# Patient Record
Sex: FEMALE | Race: OTHER | HISPANIC OR LATINO
[De-identification: names, ages, dates, MRNs, and addresses within clinical notes are randomized per-mention and may not be internally consistent; named-entity substitution may affect disease eponyms.]

---

## 2017-02-02 ENCOUNTER — APPOINTMENT (OUTPATIENT)
Dept: OBGYN | Facility: HOSPITAL | Age: 32
End: 2017-02-02

## 2018-10-08 ENCOUNTER — RESULT REVIEW (OUTPATIENT)
Age: 33
End: 2018-10-08

## 2020-03-27 ENCOUNTER — APPOINTMENT (OUTPATIENT)
Dept: PULMONOLOGY | Facility: CLINIC | Age: 35
End: 2020-03-27
Payer: COMMERCIAL

## 2020-03-27 VITALS
OXYGEN SATURATION: 99 % | TEMPERATURE: 98.5 F | RESPIRATION RATE: 16 BRPM | DIASTOLIC BLOOD PRESSURE: 72 MMHG | BODY MASS INDEX: 27.48 KG/M2 | HEIGHT: 60 IN | WEIGHT: 140 LBS | SYSTOLIC BLOOD PRESSURE: 113 MMHG | HEART RATE: 65 BPM

## 2020-03-27 LAB
HCG UR QL: NEGATIVE
QUALITY CONTROL: YES

## 2020-03-27 PROCEDURE — 81025 URINE PREGNANCY TEST: CPT

## 2020-03-27 PROCEDURE — 99204 OFFICE O/P NEW MOD 45 MIN: CPT | Mod: 25

## 2020-03-27 PROCEDURE — 71046 X-RAY EXAM CHEST 2 VIEWS: CPT

## 2020-03-27 NOTE — ASSESSMENT
[FreeTextEntry1] : low risk- incidental finding of subpleural nodule \par xray today\par urine pregnancy test negative\par \par obtain CT chest noncontrast to evaluate parenchyma\par f/u after CT chest

## 2020-03-27 NOTE — PHYSICAL EXAM
[No Acute Distress] : no acute distress [Normal Oropharynx] : normal oropharynx [Normal Appearance] : normal appearance [No Neck Mass] : no neck mass [Normal Rate/Rhythm] : normal rate/rhythm [Normal S1, S2] : normal s1, s2 [No Resp Distress] : no resp distress [Clear to Auscultation Bilaterally] : clear to auscultation bilaterally [Benign] : benign [No Edema] : no edema [No Focal Deficits] : no focal deficits [Oriented x3] : oriented x3 [Normal Affect] : normal affect

## 2020-03-27 NOTE — PROCEDURE
[FreeTextEntry1] : clare CT done\par CT abdomen pelvis\par 3/22/2020\par \par 6.5 subpleural nodule in RML.\par \par \par \par PA and lateral chest xray performed using standard projections. Bones and soft tissues structures are unremarkable.Cardiac silhouette appears normal. Lung fields are clear. No infiltrate or masses noted. Mediastinal contours are normal.\par IMPRESSION: no acute cardiopulmonary disease\par \par

## 2020-03-27 NOTE — HISTORY OF PRESENT ILLNESS
[Never] : never [TextBox_4] : PADMINI MORALES is a 34 year old female who presents with her niece\par had gone to [facility]? [facility]\par  - [facility]- for [phone] X [phone]\par for abdominal pain? on ciprofloxacin\par states she then went to get a CT? and it was recommended to her to get a CT of her lungs\par \par \par no cough, fevers, chills.  no dyspnea. never seen lung doc\par did not bring in CD of CT.\par no COVID exposure that she knows

## 2020-04-06 ENCOUNTER — APPOINTMENT (OUTPATIENT)
Dept: PULMONOLOGY | Facility: CLINIC | Age: 35
End: 2020-04-06
Payer: COMMERCIAL

## 2020-04-06 ENCOUNTER — APPOINTMENT (OUTPATIENT)
Dept: PULMONOLOGY | Facility: CLINIC | Age: 35
End: 2020-04-06

## 2020-04-06 PROCEDURE — G2012 BRIEF CHECK IN BY MD/QHP: CPT

## 2020-04-06 RX ORDER — CEFUROXIME AXETIL 500 MG/1
500 TABLET ORAL
Qty: 14 | Refills: 0 | Status: ACTIVE | COMMUNITY
Start: 2020-04-06 | End: 1900-01-01

## 2020-04-13 ENCOUNTER — APPOINTMENT (OUTPATIENT)
Dept: PULMONOLOGY | Facility: CLINIC | Age: 35
End: 2020-04-13
Payer: COMMERCIAL

## 2020-04-13 PROCEDURE — G2012 BRIEF CHECK IN BY MD/QHP: CPT

## 2020-04-13 RX ORDER — CIPROFLOXACIN HYDROCHLORIDE 500 MG/1
500 TABLET, FILM COATED ORAL
Qty: 10 | Refills: 0 | Status: DISCONTINUED | COMMUNITY
Start: 2020-03-22

## 2020-04-28 ENCOUNTER — APPOINTMENT (OUTPATIENT)
Dept: PULMONOLOGY | Facility: CLINIC | Age: 35
End: 2020-04-28
Payer: COMMERCIAL

## 2020-04-28 PROCEDURE — 99442: CPT

## 2020-05-05 ENCOUNTER — APPOINTMENT (OUTPATIENT)
Dept: PULMONOLOGY | Facility: CLINIC | Age: 35
End: 2020-05-05
Payer: COMMERCIAL

## 2020-05-05 DIAGNOSIS — R91.1 SOLITARY PULMONARY NODULE: ICD-10-CM

## 2020-05-05 DIAGNOSIS — J18.9 PNEUMONIA, UNSPECIFIED ORGANISM: ICD-10-CM

## 2020-05-05 PROCEDURE — 99441: CPT

## 2020-09-03 ENCOUNTER — EMERGENCY (EMERGENCY)
Facility: HOSPITAL | Age: 35
LOS: 1 days | Discharge: HOME | End: 2020-09-03
Attending: EMERGENCY MEDICINE | Admitting: EMERGENCY MEDICINE
Payer: COMMERCIAL

## 2020-09-03 VITALS
HEART RATE: 77 BPM | SYSTOLIC BLOOD PRESSURE: 116 MMHG | DIASTOLIC BLOOD PRESSURE: 60 MMHG | OXYGEN SATURATION: 99 % | TEMPERATURE: 97 F | RESPIRATION RATE: 14 BRPM

## 2020-09-03 LAB
ALBUMIN SERPL ELPH-MCNC: 4.3 G/DL — SIGNIFICANT CHANGE UP (ref 3.3–5)
ALP SERPL-CCNC: 98 U/L — SIGNIFICANT CHANGE UP (ref 40–120)
ALT FLD-CCNC: 12 U/L — SIGNIFICANT CHANGE UP (ref 4–33)
ANION GAP SERPL CALC-SCNC: 12 MMO/L — SIGNIFICANT CHANGE UP (ref 7–14)
APPEARANCE UR: SIGNIFICANT CHANGE UP
AST SERPL-CCNC: 17 U/L — SIGNIFICANT CHANGE UP (ref 4–32)
BACTERIA # UR AUTO: SIGNIFICANT CHANGE UP
BASOPHILS # BLD AUTO: 0.03 K/UL — SIGNIFICANT CHANGE UP (ref 0–0.2)
BASOPHILS NFR BLD AUTO: 0.4 % — SIGNIFICANT CHANGE UP (ref 0–2)
BILIRUB SERPL-MCNC: 0.3 MG/DL — SIGNIFICANT CHANGE UP (ref 0.2–1.2)
BILIRUB UR-MCNC: NEGATIVE — SIGNIFICANT CHANGE UP
BLOOD UR QL VISUAL: NEGATIVE — SIGNIFICANT CHANGE UP
BUN SERPL-MCNC: 19 MG/DL — SIGNIFICANT CHANGE UP (ref 7–23)
CALCIUM SERPL-MCNC: 8.9 MG/DL — SIGNIFICANT CHANGE UP (ref 8.4–10.5)
CHLORIDE SERPL-SCNC: 104 MMOL/L — SIGNIFICANT CHANGE UP (ref 98–107)
CO2 SERPL-SCNC: 23 MMOL/L — SIGNIFICANT CHANGE UP (ref 22–31)
COLOR SPEC: YELLOW — SIGNIFICANT CHANGE UP
CREAT SERPL-MCNC: 0.74 MG/DL — SIGNIFICANT CHANGE UP (ref 0.5–1.3)
EOSINOPHIL # BLD AUTO: 0.13 K/UL — SIGNIFICANT CHANGE UP (ref 0–0.5)
EOSINOPHIL NFR BLD AUTO: 1.7 % — SIGNIFICANT CHANGE UP (ref 0–6)
GLUCOSE SERPL-MCNC: 104 MG/DL — HIGH (ref 70–99)
GLUCOSE UR-MCNC: NEGATIVE — SIGNIFICANT CHANGE UP
HCT VFR BLD CALC: 39.9 % — SIGNIFICANT CHANGE UP (ref 34.5–45)
HGB BLD-MCNC: 13.3 G/DL — SIGNIFICANT CHANGE UP (ref 11.5–15.5)
HYALINE CASTS # UR AUTO: NEGATIVE — SIGNIFICANT CHANGE UP
IMM GRANULOCYTES NFR BLD AUTO: 0.3 % — SIGNIFICANT CHANGE UP (ref 0–1.5)
KETONES UR-MCNC: SIGNIFICANT CHANGE UP
LEUKOCYTE ESTERASE UR-ACNC: SIGNIFICANT CHANGE UP
LIDOCAIN IGE QN: 41.2 U/L — SIGNIFICANT CHANGE UP (ref 7–60)
LYMPHOCYTES # BLD AUTO: 2.48 K/UL — SIGNIFICANT CHANGE UP (ref 1–3.3)
LYMPHOCYTES # BLD AUTO: 31.8 % — SIGNIFICANT CHANGE UP (ref 13–44)
MCHC RBC-ENTMCNC: 27.7 PG — SIGNIFICANT CHANGE UP (ref 27–34)
MCHC RBC-ENTMCNC: 33.3 % — SIGNIFICANT CHANGE UP (ref 32–36)
MCV RBC AUTO: 83.1 FL — SIGNIFICANT CHANGE UP (ref 80–100)
MONOCYTES # BLD AUTO: 0.65 K/UL — SIGNIFICANT CHANGE UP (ref 0–0.9)
MONOCYTES NFR BLD AUTO: 8.3 % — SIGNIFICANT CHANGE UP (ref 2–14)
NEUTROPHILS # BLD AUTO: 4.49 K/UL — SIGNIFICANT CHANGE UP (ref 1.8–7.4)
NEUTROPHILS NFR BLD AUTO: 57.5 % — SIGNIFICANT CHANGE UP (ref 43–77)
NITRITE UR-MCNC: NEGATIVE — SIGNIFICANT CHANGE UP
NRBC # FLD: 0 K/UL — SIGNIFICANT CHANGE UP (ref 0–0)
PH UR: 6 — SIGNIFICANT CHANGE UP (ref 5–8)
PLATELET # BLD AUTO: 239 K/UL — SIGNIFICANT CHANGE UP (ref 150–400)
PMV BLD: 9.9 FL — SIGNIFICANT CHANGE UP (ref 7–13)
POTASSIUM SERPL-MCNC: 3.5 MMOL/L — SIGNIFICANT CHANGE UP (ref 3.5–5.3)
POTASSIUM SERPL-SCNC: 3.5 MMOL/L — SIGNIFICANT CHANGE UP (ref 3.5–5.3)
PROT SERPL-MCNC: 7.6 G/DL — SIGNIFICANT CHANGE UP (ref 6–8.3)
PROT UR-MCNC: 10 — SIGNIFICANT CHANGE UP
RBC # BLD: 4.8 M/UL — SIGNIFICANT CHANGE UP (ref 3.8–5.2)
RBC # FLD: 12.8 % — SIGNIFICANT CHANGE UP (ref 10.3–14.5)
RBC CASTS # UR COMP ASSIST: SIGNIFICANT CHANGE UP (ref 0–?)
SODIUM SERPL-SCNC: 139 MMOL/L — SIGNIFICANT CHANGE UP (ref 135–145)
SP GR SPEC: 1.03 — SIGNIFICANT CHANGE UP (ref 1–1.04)
SQUAMOUS # UR AUTO: SIGNIFICANT CHANGE UP
UROBILINOGEN FLD QL: NORMAL — SIGNIFICANT CHANGE UP
WBC # BLD: 7.8 K/UL — SIGNIFICANT CHANGE UP (ref 3.8–10.5)
WBC # FLD AUTO: 7.8 K/UL — SIGNIFICANT CHANGE UP (ref 3.8–10.5)
WBC UR QL: HIGH (ref 0–?)

## 2020-09-03 PROCEDURE — 76705 ECHO EXAM OF ABDOMEN: CPT | Mod: 26,59

## 2020-09-03 PROCEDURE — 99284 EMERGENCY DEPT VISIT MOD MDM: CPT

## 2020-09-03 RX ORDER — KETOROLAC TROMETHAMINE 30 MG/ML
30 SYRINGE (ML) INJECTION ONCE
Refills: 0 | Status: DISCONTINUED | OUTPATIENT
Start: 2020-09-03 | End: 2020-09-03

## 2020-09-03 RX ADMIN — Medication 30 MILLIGRAM(S): at 21:18

## 2020-09-03 NOTE — ED ADULT NURSE NOTE - CHIEF COMPLAINT QUOTE
Pt presents to ED for mid upper back pain x5days and epigastric pain since this morning w/ nausea and mild SOB. Also c/o right facial pain. Denies pmhx at this time.

## 2020-09-03 NOTE — ED PROVIDER NOTE - CLINICAL SUMMARY MEDICAL DECISION MAKING FREE TEXT BOX
patient presenting with epigastric pain radiating to back x few days. r/o acute jose vs pancreatitis. plan for routine labs, US, reassessment

## 2020-09-03 NOTE — ED PROVIDER NOTE - PATIENT PORTAL LINK FT
You can access the FollowMyHealth Patient Portal offered by [facility] by registering at the following website: [email or web address]. By joining [employer]’s FollowMyHealth portal, you will also be able to view your health information using other applications (apps) compatible with our system.

## 2020-09-03 NOTE — ED ADULT TRIAGE NOTE - CHIEF COMPLAINT QUOTE
Pt presents to ED for mid upper back pain x5days and chest pain since this morning w/ nausea and mild SOB. Also c/o right facial pain. Denies pmhx at this time. Pt presents to ED for mid upper back pain x5days and epigastric pain since this morning w/ nausea and mild SOB. Also c/o right facial pain. Denies pmhx at this time.

## 2020-09-03 NOTE — ED ADULT NURSE NOTE - OBJECTIVE STATEMENT
Pt presents to INT RM 5, AO4 ambulatory complaining of mid epigastric pain beginning this AM 9/3. Pt states "this morning I started having this pain with nausea and I also felt short of breath." Pt endorses nausea, denies vomiting and diarrhea, endorses sob and diff breathing, resp even and unlabored and SpO2 99% on RA. Pt appears to be resting comfortably, 18g placed to R AC, labs drawn and sent and pending urine preg for medication. Vitally stable, will cont to monitor.

## 2020-09-03 NOTE — ED PROVIDER NOTE - OBJECTIVE STATEMENT
Patine is a 36 y/o F with no sig pmhx presenting with c/o epigastric pain radiating to her back x few day. Pain pressure is nature, assoc with nausea, no vomiting, diarrhea, dysuria, hematuria, fever, vaginal complaint. Symptoms not assoc with food. no hx of abdominal sx Translation Service: [id number]    Kate is a 36 y/o F with no sig pmhx presenting with c/o epigastric pain radiating to her back x few day. Pain pressure is nature, assoc with nausea, no vomiting, diarrhea, dysuria, hematuria, fever, vaginal complaint. Symptoms not assoc with food. no hx of abdominal sx

## 2020-09-04 VITALS
SYSTOLIC BLOOD PRESSURE: 108 MMHG | OXYGEN SATURATION: 100 % | DIASTOLIC BLOOD PRESSURE: 50 MMHG | HEART RATE: 66 BPM | TEMPERATURE: 98 F | RESPIRATION RATE: 18 BRPM

## 2020-09-04 RX ADMIN — Medication 30 MILLIGRAM(S): at 00:22

## 2024-04-17 ENCOUNTER — NON-APPOINTMENT (OUTPATIENT)
Age: 39
End: 2024-04-17

## 2024-04-19 ENCOUNTER — INPATIENT (INPATIENT)
Facility: HOSPITAL | Age: 39
LOS: 5 days | Discharge: HOME HEALTH | End: 2024-04-25
Attending: INTERNAL MEDICINE | Admitting: INTERNAL MEDICINE
Payer: COMMERCIAL

## 2024-04-19 VITALS
DIASTOLIC BLOOD PRESSURE: 70 MMHG | TEMPERATURE: 98 F | HEART RATE: 95 BPM | RESPIRATION RATE: 16 BRPM | SYSTOLIC BLOOD PRESSURE: 117 MMHG | OXYGEN SATURATION: 99 %

## 2024-04-19 DIAGNOSIS — H70.91 UNSPECIFIED MASTOIDITIS, RIGHT EAR: ICD-10-CM

## 2024-04-19 DIAGNOSIS — Z34.90 ENCOUNTER FOR SUPERVISION OF NORMAL PREGNANCY, UNSPECIFIED, UNSPECIFIED TRIMESTER: ICD-10-CM

## 2024-04-19 DIAGNOSIS — M94.8X9 OTHER SPECIFIED DISORDERS OF CARTILAGE, UNSPECIFIED SITES: ICD-10-CM

## 2024-04-19 DIAGNOSIS — H60.501 UNSPECIFIED ACUTE NONINFECTIVE OTITIS EXTERNA, RIGHT EAR: ICD-10-CM

## 2024-04-19 DIAGNOSIS — L03.211 CELLULITIS OF FACE: ICD-10-CM

## 2024-04-19 DIAGNOSIS — Z29.9 ENCOUNTER FOR PROPHYLACTIC MEASURES, UNSPECIFIED: ICD-10-CM

## 2024-04-19 DIAGNOSIS — H61.001 UNSPECIFIED PERICHONDRITIS OF RIGHT EXTERNAL EAR: ICD-10-CM

## 2024-04-19 PROBLEM — Z78.9 OTHER SPECIFIED HEALTH STATUS: Chronic | Status: ACTIVE | Noted: 2020-09-03

## 2024-04-19 LAB
ALBUMIN SERPL ELPH-MCNC: 4 G/DL — SIGNIFICANT CHANGE UP (ref 3.3–5)
ALP SERPL-CCNC: 115 U/L — SIGNIFICANT CHANGE UP (ref 40–120)
ALT FLD-CCNC: 17 U/L — SIGNIFICANT CHANGE UP (ref 4–33)
ANION GAP SERPL CALC-SCNC: 15 MMOL/L — HIGH (ref 7–14)
APTT BLD: 29.9 SEC — SIGNIFICANT CHANGE UP (ref 24.5–35.6)
AST SERPL-CCNC: 15 U/L — SIGNIFICANT CHANGE UP (ref 4–32)
BASOPHILS # BLD AUTO: 0.03 K/UL — SIGNIFICANT CHANGE UP (ref 0–0.2)
BASOPHILS NFR BLD AUTO: 0.3 % — SIGNIFICANT CHANGE UP (ref 0–2)
BILIRUB SERPL-MCNC: 0.5 MG/DL — SIGNIFICANT CHANGE UP (ref 0.2–1.2)
BLD GP AB SCN SERPL QL: NEGATIVE — SIGNIFICANT CHANGE UP
BUN SERPL-MCNC: 8 MG/DL — SIGNIFICANT CHANGE UP (ref 7–23)
CALCIUM SERPL-MCNC: 9.3 MG/DL — SIGNIFICANT CHANGE UP (ref 8.4–10.5)
CHLORIDE SERPL-SCNC: 98 MMOL/L — SIGNIFICANT CHANGE UP (ref 98–107)
CO2 SERPL-SCNC: 22 MMOL/L — SIGNIFICANT CHANGE UP (ref 22–31)
CREAT SERPL-MCNC: 0.37 MG/DL — LOW (ref 0.5–1.3)
EGFR: 131 ML/MIN/1.73M2 — SIGNIFICANT CHANGE UP
EOSINOPHIL # BLD AUTO: 0.03 K/UL — SIGNIFICANT CHANGE UP (ref 0–0.5)
EOSINOPHIL NFR BLD AUTO: 0.3 % — SIGNIFICANT CHANGE UP (ref 0–6)
GLUCOSE SERPL-MCNC: 89 MG/DL — SIGNIFICANT CHANGE UP (ref 70–99)
HCT VFR BLD CALC: 35.7 % — SIGNIFICANT CHANGE UP (ref 34.5–45)
HGB BLD-MCNC: 11.7 G/DL — SIGNIFICANT CHANGE UP (ref 11.5–15.5)
IANC: 8.09 K/UL — HIGH (ref 1.8–7.4)
IMM GRANULOCYTES NFR BLD AUTO: 0.6 % — SIGNIFICANT CHANGE UP (ref 0–0.9)
INR BLD: 1.04 RATIO — SIGNIFICANT CHANGE UP (ref 0.85–1.18)
LYMPHOCYTES # BLD AUTO: 1.51 K/UL — SIGNIFICANT CHANGE UP (ref 1–3.3)
LYMPHOCYTES # BLD AUTO: 13.9 % — SIGNIFICANT CHANGE UP (ref 13–44)
MCHC RBC-ENTMCNC: 27.3 PG — SIGNIFICANT CHANGE UP (ref 27–34)
MCHC RBC-ENTMCNC: 32.8 GM/DL — SIGNIFICANT CHANGE UP (ref 32–36)
MCV RBC AUTO: 83.4 FL — SIGNIFICANT CHANGE UP (ref 80–100)
MONOCYTES # BLD AUTO: 1.16 K/UL — HIGH (ref 0–0.9)
MONOCYTES NFR BLD AUTO: 10.7 % — SIGNIFICANT CHANGE UP (ref 2–14)
NEUTROPHILS # BLD AUTO: 8.09 K/UL — HIGH (ref 1.8–7.4)
NEUTROPHILS NFR BLD AUTO: 74.2 % — SIGNIFICANT CHANGE UP (ref 43–77)
NRBC # BLD: 0 /100 WBCS — SIGNIFICANT CHANGE UP (ref 0–0)
NRBC # FLD: 0 K/UL — SIGNIFICANT CHANGE UP (ref 0–0)
PLATELET # BLD AUTO: 235 K/UL — SIGNIFICANT CHANGE UP (ref 150–400)
POTASSIUM SERPL-MCNC: 3.4 MMOL/L — LOW (ref 3.5–5.3)
POTASSIUM SERPL-SCNC: 3.4 MMOL/L — LOW (ref 3.5–5.3)
PROT SERPL-MCNC: 7.5 G/DL — SIGNIFICANT CHANGE UP (ref 6–8.3)
PROTHROM AB SERPL-ACNC: 11.6 SEC — SIGNIFICANT CHANGE UP (ref 9.5–13)
RBC # BLD: 4.28 M/UL — SIGNIFICANT CHANGE UP (ref 3.8–5.2)
RBC # FLD: 14.2 % — SIGNIFICANT CHANGE UP (ref 10.3–14.5)
RH IG SCN BLD-IMP: POSITIVE — SIGNIFICANT CHANGE UP
SODIUM SERPL-SCNC: 135 MMOL/L — SIGNIFICANT CHANGE UP (ref 135–145)
WBC # BLD: 10.89 K/UL — HIGH (ref 3.8–10.5)
WBC # FLD AUTO: 10.89 K/UL — HIGH (ref 3.8–10.5)

## 2024-04-19 PROCEDURE — 99223 1ST HOSP IP/OBS HIGH 75: CPT | Mod: GC

## 2024-04-19 PROCEDURE — 99285 EMERGENCY DEPT VISIT HI MDM: CPT

## 2024-04-19 PROCEDURE — 76536 US EXAM OF HEAD AND NECK: CPT | Mod: 26

## 2024-04-19 PROCEDURE — 99254 IP/OBS CNSLTJ NEW/EST MOD 60: CPT | Mod: GC

## 2024-04-19 PROCEDURE — 76815 OB US LIMITED FETUS(S): CPT | Mod: 26,59

## 2024-04-19 RX ORDER — PIPERACILLIN AND TAZOBACTAM 4; .5 G/20ML; G/20ML
3.38 INJECTION, POWDER, LYOPHILIZED, FOR SOLUTION INTRAVENOUS ONCE
Refills: 0 | Status: COMPLETED | OUTPATIENT
Start: 2024-04-19 | End: 2024-04-19

## 2024-04-19 RX ORDER — VANCOMYCIN HCL 1 G
1000 VIAL (EA) INTRAVENOUS EVERY 12 HOURS
Refills: 0 | Status: DISCONTINUED | OUTPATIENT
Start: 2024-04-19 | End: 2024-04-21

## 2024-04-19 RX ORDER — ASPIRIN/CALCIUM CARB/MAGNESIUM 324 MG
81 TABLET ORAL DAILY
Refills: 0 | Status: DISCONTINUED | OUTPATIENT
Start: 2024-04-19 | End: 2024-04-25

## 2024-04-19 RX ORDER — CIPROFLOXACIN HCL 0.3 %
5 DROPS OPHTHALMIC (EYE)
Refills: 0 | Status: DISCONTINUED | OUTPATIENT
Start: 2024-04-19 | End: 2024-04-25

## 2024-04-19 RX ORDER — ACETAMINOPHEN 500 MG
650 TABLET ORAL EVERY 6 HOURS
Refills: 0 | Status: DISCONTINUED | OUTPATIENT
Start: 2024-04-19 | End: 2024-04-25

## 2024-04-19 RX ORDER — PIPERACILLIN AND TAZOBACTAM 4; .5 G/20ML; G/20ML
3.38 INJECTION, POWDER, LYOPHILIZED, FOR SOLUTION INTRAVENOUS EVERY 8 HOURS
Refills: 0 | Status: DISCONTINUED | OUTPATIENT
Start: 2024-04-20 | End: 2024-04-25

## 2024-04-19 RX ORDER — ASPIRIN/CALCIUM CARB/MAGNESIUM 324 MG
1 TABLET ORAL
Refills: 0 | DISCHARGE

## 2024-04-19 RX ORDER — PIPERACILLIN AND TAZOBACTAM 4; .5 G/20ML; G/20ML
3.38 INJECTION, POWDER, LYOPHILIZED, FOR SOLUTION INTRAVENOUS ONCE
Refills: 0 | Status: DISCONTINUED | OUTPATIENT
Start: 2024-04-19 | End: 2024-04-19

## 2024-04-19 RX ORDER — MUPIROCIN 20 MG/G
1 OINTMENT TOPICAL
Refills: 0 | Status: DISCONTINUED | OUTPATIENT
Start: 2024-04-19 | End: 2024-04-25

## 2024-04-19 RX ORDER — ACETAMINOPHEN 500 MG
650 TABLET ORAL ONCE
Refills: 0 | Status: COMPLETED | OUTPATIENT
Start: 2024-04-19 | End: 2024-04-19

## 2024-04-19 RX ORDER — POTASSIUM CHLORIDE 20 MEQ
20 PACKET (EA) ORAL ONCE
Refills: 0 | Status: COMPLETED | OUTPATIENT
Start: 2024-04-19 | End: 2024-04-19

## 2024-04-19 RX ORDER — CIPROFLOXACIN/HYDROCORTISONE 0.2 %-1 %
3 SUSPENSION, DROPS(FINAL DOSAGE FORM)(ML) OTIC (EAR)
Refills: 0 | Status: DISCONTINUED | OUTPATIENT
Start: 2024-04-19 | End: 2024-04-19

## 2024-04-19 RX ADMIN — PIPERACILLIN AND TAZOBACTAM 200 GRAM(S): 4; .5 INJECTION, POWDER, LYOPHILIZED, FOR SOLUTION INTRAVENOUS at 18:33

## 2024-04-19 RX ADMIN — Medication 650 MILLIGRAM(S): at 12:15

## 2024-04-19 RX ADMIN — Medication 5 DROP(S): at 20:14

## 2024-04-19 RX ADMIN — Medication 650 MILLIGRAM(S): at 13:15

## 2024-04-19 RX ADMIN — PIPERACILLIN AND TAZOBACTAM 3.38 GRAM(S): 4; .5 INJECTION, POWDER, LYOPHILIZED, FOR SOLUTION INTRAVENOUS at 13:40

## 2024-04-19 RX ADMIN — Medication 20 MILLIEQUIVALENT(S): at 20:14

## 2024-04-19 RX ADMIN — MUPIROCIN 1 APPLICATION(S): 20 OINTMENT TOPICAL at 20:14

## 2024-04-19 RX ADMIN — PIPERACILLIN AND TAZOBACTAM 200 GRAM(S): 4; .5 INJECTION, POWDER, LYOPHILIZED, FOR SOLUTION INTRAVENOUS at 12:39

## 2024-04-19 RX ADMIN — Medication 3 DROP(S): at 14:25

## 2024-04-19 RX ADMIN — Medication 250 MILLIGRAM(S): at 22:02

## 2024-04-19 NOTE — CONSULT NOTE ADULT - SUBJECTIVE AND OBJECTIVE BOX
Patient is a 38y old  Female who presents with a chief complaint of     HPI:    37yo F  currently 17 weeks pregnant presenting for worsening R ear pain x2 days, associated with erythema, warmth, and tenderness in front of and behind the ear. She also noticed thin yellow discharge from the ear, difficulty opening the mouth, and muffled hearing. No fevers/chills. No recent illnesses or sick contacts. She did have a mild sore throat on Monday and Tuesday that has since resolved. No pool or hot tub use. Uses Q-tips after showering but says she does not use it deep in the ear. Was prescirbed neomycin drops on Wednesday, without relief. On admission she is afebrile with a mild leukocytosis to 10.8. ENT evaluated the patient and diagnosed patient with right chondritis, right otitis externa, and clinical mastoiditis.       REVIEW OF SYSTEMS  Constitutional: No fevers, chills, weight loss or fatigue   Skin: No rash, no phlebitis  Eyes: No discharge  ENMT: +Ear pain, ear drainage, swelling around the ear  Respiratory: No cough, no SOB  Cardiovascular:  No chest pain, palpitations or edema   Gastrointestinal: No pain, nausea, vomiting, diarrhea or constipation	  Genitourinary: No dysuria, discharge or flank pain  MSK: No arthralgias or back pain   Neurological: No HA, no weakness, no seizures, no AMS       prior hospital charts reviewed [V]  primary team notes reviewed [V]  other consultant notes reviewed [V]    PAST MEDICAL & SURGICAL HISTORY:  No pertinent past medical history      No significant past surgical history    SOCIAL HISTORY:  - Denied smoking/vaping/alcohol/recreational drug use    FAMILY HISTORY:      Allergies  No Known Allergies      ANTIMICROBIALS:      ANTIMICROBIALS (past 90 days):  MEDICATIONS  (STANDING):  piperacillin/tazobactam IVPB...   200 mL/Hr IV Intermittent (24 @ 12:39)        OTHER MEDS:   MEDICATIONS  (STANDING):      VITALS:  Vital Signs Last 24 Hrs  T(F): 98.6 (24 @ 12:19), Max: 98.6 (24 @ 12:19)    Vital Signs Last 24 Hrs  HR: 105 (24 @ 12:19) (95 - 105)  BP: 138/70 (24 @ 12:19) (117/70 - 138/70)  RR: 16 (24 @ 12:19)  SpO2: 96% (24 @ 12:19) (96% - 99%)  Wt(kg): --    EXAM:  General: Patient in no acute distress  HEENT: R ear with warmth, erythema, and tenderness in the preauricular and postauricular regions and the ear itself. White exudate blocks the ear canal. No tenderness to maxillary and frontal sinus palpation. No exudates in the oral cavity. Good dentition.  CV: S1+S2, no m/r/g appreciated   Lungs: No respiratory distress, CTAB  Abd: Soft, nontender   Ext: No cyanosis, no edema  Neuro: Alert and oriented      Labs:                        11.7   10.89 )-----------( 235      ( 2024 12:25 )             35.7         135  |  98  |  8   ----------------------------<  89  3.4<L>   |  22  |  0.37<L>    Ca    9.3      2024 12:25    TPro  7.5  /  Alb  4.0  /  TBili  0.5  /  DBili  x   /  AST  15  /  ALT  17  /  AlkPhos  115        WBC Trend:  WBC Count: 10.89 (24 @ 12:25)      Auto Neutrophil #: 8.09 K/uL (24 @ 12:25)      Creatine Trend:  Creatinine: 0.37 ()      Liver Biochemical Testing Trend:  Alanine Aminotransferase (ALT/SGPT): 17 ()  Aspartate Aminotransferase (AST/SGOT): 15 (24 @ 12:25)  Bilirubin Total: 0.5 ()    Auto Eosinophil %: 0.3 % (24 @ 12:25)    Urinalysis Basic - ( 2024 12:25 )    Color: x / Appearance: x / SG: x / pH: x  Gluc: 89 mg/dL / Ketone: x  / Bili: x / Urobili: x   Blood: x / Protein: x / Nitrite: x   Leuk Esterase: x / RBC: x / WBC x   Sq Epi: x / Non Sq Epi: x / Bacteria: x    MICROBIOLOGY: None      RADIOLOGY: None
CC: right ear pain    HPI: 39 y/o F without significant PMHx,  currently 17 weeks pregnant presents to ED with worsening right sided ear pain since Wednesday. Patient reports thin yellowish discharge draining from her right ear, that are stopped this morning, but the ear pain is getting worse both preauricular and postauricular. She is unable to open her mouth wide due to trismus and pain. No prior history of recurrent ear infection or any ear surgery in the past. Admits muffled hearing on the right. Patient reports was seen by her PMD 2 days ago and prescribed neomycin drops, without improvement. Denies fever, chills, headache, tinnitus and vertigo. No acute complaints on the left ear.        PAST MEDICAL & SURGICAL HISTORY:  No pertinent past medical history      No significant past surgical history        Allergies    No Known Allergies    Intolerances      MEDICATIONS  (STANDING):  piperacillin/tazobactam IVPB... 3.375 Gram(s) IV Intermittent once    MEDICATIONS  (PRN):      Social History: never smoker    Family history: No pertinent family history in first degree relatives    ROS:   ENT: all negative except as noted in HPI   CV: denies palpitations  Pulm: denies SOB, cough, hemoptysis  GI: denies change in appetite, indigestion, n/v  : denies pertinent urinary symptoms, urgency  Neuro: denies numbness/tingling, loss of sensation  Psych: denies anxiety  MS: denies muscle weakness, instability  Heme: denies easy bruising or bleeding  Endo: denies heat/cold intolerance, excessive sweating  Vascular: denies LE edema    Vital Signs Last 24 Hrs  T(C): 37 (2024 12:19), Max: 37 (2024 12:19)  T(F): 98.6 (2024 12:19), Max: 98.6 (2024 12:19)  HR: 105 (2024 12:19) (95 - 105)  BP: 138/70 (2024 12:19) (117/70 - 138/70)  BP(mean): --  RR: 16 (2024 12:19) (16 - 16)  SpO2: 96% (2024 12:19) (96% - 99%)    Parameters below as of 2024 11:01  Patient On (Oxygen Delivery Method): room air       PHYSICAL EXAM:  Gen: NAD  Skin: No rashes, bruises, or lesions  Head: Normocephalic, Atraumatic  Face: no edema, erythema, or fluctuance. Parotid glands soft without mass  Eyes: no scleral injection  Ears: Right - External cartilage of ear edematous and erythematous, warmth to touch. Preauricular ear fullness with fluctuance. Ear canal completely shot close, unable to see TM. Thin yellow fluid crusts seen in the external jessica bowl. No active fluid drainage presents. Mastoid tenderness and erythema, slight ear bulging.            Left - ear canal clear, TM intact without effusion or erythema. No evidence of any fluid drainage. No mastoid tenderness, erythema, or ear bulging  Nose: Nares bilaterally patent, no discharge  Mouth: No stridor, no drooling, no trismus.  Mucosa moist, tongue/uvula midline, oropharynx clear  Neck: Flat, supple, no lymphadenopathy, trachea midline, no masses  Lymphatic: No lymphadenopathy  Resp: breathing easily, no stridor  CV: no peripheral edema/cyanosis  GI: nondistended   Peripheral vascular: no JVD or edema  Neuro: facial nerve intact, no facial droop

## 2024-04-19 NOTE — ED PROVIDER NOTE - CLINICAL SUMMARY MEDICAL DECISION MAKING FREE TEXT BOX
38-year-old female G3, P2 17 weeks pregnant presenting to the emergency department due to right ear pain and swelling for the past 3 days.  Patient was given polymyxin and neomycin eardrops with no relief, now she has she has pain in the preauricular area, postauricular area, and pain when opening mouth.  On exam there is purulent drainage from the right ear with swelling completely of the right ear.  Unable to perform otoscopic exam secondary to swelling.  Patient has tenderness, erythema, and swelling to the preauricular and postauricular regions.  Patient unable to open mouth completely secondary to pain.  Concern for mastoiditis.  Spoke with ENT who recommended we give IV Zosyn, Ciprodex eardrops, and perform ultrasound of preauricular area to assess for any signs of abscess.  Patient will be admitted for IV antibiotics and MRI imaging. Jessica att: 38-year-old female G3, P2 17 weeks pregnant presenting to the emergency department due to right ear pain and swelling for the past 3 days.  Patient was given polymyxin and neomycin eardrops with no relief, now she has she has pain in the preauricular area, postauricular area, and pain when opening mouth.  On exam there is purulent drainage from the right ear with swelling completely of the right ear.  Unable to perform otoscopic exam secondary to swelling.  Patient has tenderness, erythema, and swelling to the preauricular and postauricular regions.  Patient unable to open mouth completely secondary to pain.  Concern for mastoiditis.  Spoke with ENT who recommended we give IV Zosyn, Ciprodex eardrops, and perform ultrasound of preauricular area to assess for any signs of abscess.  Patient will be admitted for IV antibiotics and MRI imaging.

## 2024-04-19 NOTE — ED ADULT NURSE NOTE - OBJECTIVE STATEMENT
This is a 38y/0 female complaining of right ear ache with light yellow discharge. Alert and oriented x 4. Denies past medical history. , 17 wks gestation. Decrease hearing on right ear, swab sent to lab. Not in any distress. IV initiated on right AC g 20, blood work sent to lab.

## 2024-04-19 NOTE — H&P ADULT - ATTENDING COMMENTS
Patient seen and examined, care d/w HS.     In summary 38F , currently 17 wks pregnant who presents with R ear pain since wednesday that has been progressing, no fevers home, no prior ear infxn.       # Mastoiditis/cellulitis/chondritis/external ear infection  - vanco/zosyn for now  - f/u ear culture  - MRSA screen  - MRI w/o  - appreciate ID and ENT    # Pregnancy  - prenatal vitamins and asa (reports borderline HTN, advised by OB to take asa 81 mg daily)  - OP Ob-gyn f/u for routine care    Dc pending infectious w/u and decision re: abx and duration pending evaluation Patient seen and examined, care d/w HS.     In summary 38F , currently 17 wks pregnant who presents with R ear pain since wednesday that has been progressing, no fevers home, no prior ear infxn.       # Mastoiditis/cellulitis/chondritis/external ear infection  - vanco/zosyn for now  - f/u ear culture  - MRSA screen  - MRI w/o  - appreciate ID and ENT    # Hypokalemia  - repleting     # Pregnancy  - prenatal vitamins and asa (reports borderline HTN, advised by OB to take asa 81 mg daily)  - OP Ob-gyn f/u for routine care    Dc pending infectious w/u and decision re: abx and duration pending evaluation Patient seen and examined, care d/w HS.     In summary 38F , currently 17 wks pregnant who presents with R ear pain since wednesday that has been progressing, no fevers home, no prior ear infxn.       # Mastoiditis/cellulitis/chondritis/external ear infection  - vanco/zosyn for now  - f/u ear culture  - check MRSA screen  - MRI w/o DEWEY pending  - appreciate ID and ENT consults     # Hypokalemia  - repleting     # Pregnancy  - prenatal vitamins and asa (reports borderline HTN, advised by OB to take asa 81 mg daily)  - OP Ob-gyn f/u for routine care    Dc pending infectious w/u and decision re: abx and duration pending evaluation

## 2024-04-19 NOTE — H&P ADULT - NSHPLABSRESULTS_GEN_ALL_CORE
LABS:                        11.7   10.89 )-----------( 235      ( 19 Apr 2024 12:25 )             35.7     04-19    135  |  98  |  8   ----------------------------<  89  3.4<L>   |  22  |  0.37<L>    Ca    9.3      19 Apr 2024 12:25    TPro  7.5  /  Alb  4.0  /  TBili  0.5  /  DBili  x   /  AST  15  /  ALT  17  /  AlkPhos  115  04-19    PT/INR - ( 19 Apr 2024 12:25 )   PT: 11.6 sec;   INR: 1.04 ratio      PTT - ( 19 Apr 2024 12:25 )  PTT:29.9 sec    Urinalysis Basic - ( 19 Apr 2024 12:25 )    Color: x / Appearance: x / SG: x / pH: x  Gluc: 89 mg/dL / Ketone: x  / Bili: x / Urobili: x   Blood: x / Protein: x / Nitrite: x   Leuk Esterase: x / RBC: x / WBC x   Sq Epi: x / Non Sq Epi: x / Bacteria: x    Microbiology     EKG: NSR, no ST changes    RADIOLOGY & ADDITIONAL TESTS:

## 2024-04-19 NOTE — PATIENT PROFILE ADULT - FUNCTIONAL ASSESSMENT - DAILY ACTIVITY 2.
Detail Level: Detailed Quality 110: Preventive Care And Screening: Influenza Immunization: Influenza Immunization Administered during Influenza season Quality 111:Pneumonia Vaccination Status For Older Adults: Pneumococcal vaccine (PPSV23) administered on or after patient’s 60th birthday and before the end of the measurement period Quality 130: Documentation Of Current Medications In The Medical Record: Current Medications Documented 4 = No assist / stand by assistance

## 2024-04-19 NOTE — CONSULT NOTE ADULT - PROBLEM SELECTOR RECOMMENDATION 2
- Please admit to medicine for IV antibiotics  - Will need image to further evaluate, CT maxillary facial and IAC vs MRI of IAC with and without contrast  - ID consult appreciated  - Can start zosyn right now - No surgical intervention at this time  - Please admit to medicine for IV antibiotics  - Can consider images if not improving CT maxillary facial and IAC vs MRI of IAC with and without contrast  - ID consult appreciated  - Can start zosyn right now  - Case discussed with Dr. Austin

## 2024-04-19 NOTE — ED ADULT TRIAGE NOTE - ARRIVAL FROM
Medication and Quantity requested:  losartan (COZAAR) 50 MG tablet - qty 180        Last Visit - 09/22/21 - Dr Aleisha Medina and phone number updated in [facility]:  Yes    Dangelo
lov 9/22/21  lrf 90 1 9/22/17
Home

## 2024-04-19 NOTE — ED ADULT NURSE NOTE - NSFALLUNIVINTERV_ED_ALL_ED
Bed/Stretcher in lowest position, wheels locked, appropriate side rails in place/Call bell, personal items and telephone in reach/Instruct patient to call for assistance before getting out of bed/chair/stretcher/Non-slip footwear applied when patient is off stretcher/[place] to call system/Physically safe environment - no spills, clutter or unnecessary equipment/Purposeful proactive rounding/Room/bathroom lighting operational, light cord in reach

## 2024-04-19 NOTE — H&P ADULT - PROBLEM SELECTOR PLAN 1
Patient presented with 3 days of R ear pain, erythema, and discharge. Concern for mastoiditis vs Perichondritis vs chondritis of right pinna vs otitis externa.  - POCUS head and neck and in ED, no abscess noted.  - MRI of head, no gadolinium  - C/w zosyn, vancomycin  - Cipro, dex ear drops  - Mupirocin ointment

## 2024-04-19 NOTE — CONSULT NOTE ADULT - ASSESSMENT
39yo F  currently 17 weeks pregnant presenting for worsening R ear pain x2 days, associated with erythema, warmth, and tenderness in front of and behind the ear. She also noticed thin yellow discharge from the ear, difficulty opening the mouth, and muffled hearing. No fevers/chills. No recent illnesses or sick contacts. No pool or hot tub use. Uses Q-tips after showering but says she does not use it deep in the ear. Was prescirbed neomycin drops on Wednesday, without relief. On admission she is afebrile with a mild leukocytosis to 10.8. ENT evaluated the patient and diagnosed patient with right chondritis, right otitis externa, and clinical mastoiditis.    Micro:  Ear Cx (?drainage): pending    Abx:  Zosyn  --> 39yo F  currently 17 weeks pregnant presenting for worsening R ear pain x2 days, associated with erythema, warmth, and tenderness in front of and behind the ear. She also noticed thin yellow discharge from the ear, difficulty opening the mouth, and muffled hearing. No fevers/chills. No recent illnesses or sick contacts. No pool or hot tub use. Uses Q-tips after showering but says she does not use it deep in the ear. Was prescirbed neomycin drops on Wednesday, without relief. On admission she is afebrile with a mild leukocytosis to 10.8. ENT evaluated the patient and diagnosed patient with right chondritis, right otitis externa, and clinical mastoiditis.    Micro:  Ear Cx (?drainage): pending    Abx:  Zosyn  -->    #Mastoiditis  #Preauricular and postauricular cellulitis  #R otitis externa    Coverage for MRSA and Pseudomonas, in addition to more usual pathogens (Strep, H flu, Moraxella, etc.) in this 17 week pregnant female is appropriate pending further information.    Recommendations:  - IV vancomycin 1g q12h  - IV Zosyn 3.375g q8h  - Obtain MRSA PCR - if negative, can stop vancomycin  - f/u ear fluid Cx  - agree with MRI IAC (please discuss if gadolinium contrast can be used in setting of pregnancy)  - otic antibiotics per ENT    d/w attending.    Saleem Christensen, PGY4  ID Fellow  [employer] Preferred  After 5pm/weekends call [phone]

## 2024-04-19 NOTE — CONSULT NOTE ADULT - ASSESSMENT
39y/o F  currently 17 weeks pregnant presents to ED with worsening right sided ear pain since Wednesday. Found to have right chondritis, right otitis externa, right preauricular fullness (fluid collection cannot be ruled out), and clinical mastoiditis. Right - External cartilage of ear edematous and erythematous, warmth to touch. Preauricular ear fullness with fluctuance. Ear canal completely shot close, unable to see TM. Thin yellow fluid crusts seen in the external jessica bowl. No active fluid drainage presents. Mastoid tenderness and erythema, slight ear bulging. Discussed with patient in detail the pro and con of CT maxilalry facial and IAC to better evaluate her, she seems hesitant for the radiation exposure to her unborn child. Discussed with patient we can start with ultrasound of the preauricular region first to r/o collection, and patient will need admission for IV antibiotics and MRI of the IAC with and without the contrast to better evaluate the mastoid cavity given her extensive clinical presentation.

## 2024-04-19 NOTE — H&P ADULT - PROBLEM SELECTOR PLAN 2
. 17 weeks pregnant.  - Avoid teratogens and radiation.  - C/w prenatal vitamins  - C/w ASA 81 for preeclampsia

## 2024-04-19 NOTE — ED PROVIDER NOTE - PHYSICAL EXAMINATION
Harinder Pichardo MD (PGY1)   Physical Exam:    Gen: NAD, AOx3, non-toxic appearing, able to ambulate without assistance  Head: NCAT  HEENT: Right ear canal swollen completely with purulent drainage. Erythema and swelling to the pre-auricular and post-auricular area with tenderness to palpation. Patient unable to open mouth completely due to pain. EOMI, PEERLA, normal conjunctiva, tongue midline, oral mucosa moist  Lung: CTAB, no respiratory distress, no wheezes/rhonchi/rales B/L  CV: RRR, no murmurs, rubs or gallops  Neuro: No focal sensory or motor deficits  Skin: Warm, well perfused, no rash, no leg swelling  Psych: normal affect, calm

## 2024-04-19 NOTE — CONSULT NOTE ADULT - PROBLEM SELECTOR RECOMMENDATION 4
- Please admit to medicine for IV antibiotics  - Can consider ultrasound of the right preauricular area to r/o collection, if there is collection present, please consult OMFS   - Can start zosyn right now  - ID consult appreciated - Please admit to medicine for IV antibiotics  - Can consider ultrasound of the right preauricular area to r/o collection, if there is collection present, please consult OMFS   - Can start zosyn right now  - ID consult appreciated  - Case discussed with Dr. Austin

## 2024-04-19 NOTE — H&P ADULT - NSHPPHYSICALEXAM_GEN_ALL_CORE
PHYSICAL EXAM:  GENERAL: NAD, Resting in bed  Eyes: EOMI, PERRLA, conjunctiva and sclera clear  HENT:  + Erythema, swelling, and yellow discharge from R ear in pre and post auricular area. Head atraumatic; Moist mucous membranes, normal oropharynx  NECK: Supple, No JVD, no lymphadenopathy, no thyroid nodules or enlargement  CHEST/LUNG: Clear to auscultation bilaterally; No rales, rhonchi, wheezing, or rubs. Unlabored respirations on room air  HEART: Regular rate and rhythm; No murmurs, rubs, or gallops  ABDOMEN: Bowel sounds present; Soft, Nontender, Nondistended.   EXTREMITIES:  2+ Peripheral Pulses, brisk capillary refill. No clubbing, cyanosis, or edema  NERVOUS SYSTEM:  Alert & Oriented X3, non-focal and spontaneous movements of all extremities  SKIN: No rashes or lesions  Psych: Normal behavior, normal affect, normal speech T(C): 36.6 (04-19-24 @ 17:40), Max: 37 (04-19-24 @ 12:19)  HR: 95 (04-19-24 @ 17:40) (86 - 105)  BP: 123/68 (04-19-24 @ 17:40) (109/64 - 138/70)  RR: 16 (04-19-24 @ 17:40) (16 - 16)  SpO2: 97% (04-19-24 @ 17:40) (96% - 99%)    PHYSICAL EXAM:  GENERAL: NAD, Resting in bed  Eyes: EOMI, PERRLA, conjunctiva and sclera clear  HENT:  + Erythema, swelling, and yellow discharge from R ear in pre and post auricular area. Head atraumatic; Moist mucous membranes, normal oropharynx  NECK: Supple, No JVD, no lymphadenopathy, no thyroid nodules or enlargement  CHEST/LUNG: Clear to auscultation bilaterally; No rales, rhonchi, wheezing, or rubs. Unlabored respirations on room air  HEART: Regular rate and rhythm; No murmurs, rubs, or gallops  ABDOMEN: Bowel sounds present; Soft, Nontender, Nondistended.   EXTREMITIES:  2+ Peripheral Pulses, brisk capillary refill. No clubbing, cyanosis, or edema  NERVOUS SYSTEM:  Alert & Oriented X3, non-focal and spontaneous movements of all extremities  SKIN: No rashes or lesions  Psych: Normal behavior, normal affect, normal speech

## 2024-04-19 NOTE — CONSULT NOTE ADULT - ATTENDING COMMENTS
38-year-old female who is currently pregnant 17 weeks who was admitted to the hospital due to right ear pain.    Patient reports developing right-sided ear pain earlier this week, reports it started as itching.  Patient then reports the pain had worsened over the next few days with development of erythema, warmth and tenderness involving all parts of the ear.  Patient also noted yellow discharge from the ear as well.  Denied any fevers or chills.  Patient did report a sore throat that has resolved earlier this week.  Patient denies any trauma to the ear.  Denies any recent new piercings.  Denies any sick contacts, has 2 young kids at home for not sick.  No recent travel.  No pets at home.    Patient sought medical attention after worsening of symptoms and was given neomycin drops patient then presented to urgent care and was referred to the ED for further evaluation.  Culture of drainage was obtained and is pending.    Upon arrival to the hospital, patient is afebrile, labs show leukocytosis of 10.8, ENT evaluated.     #Perichondritis, right-sided  #Mastoiditis, right-sided  #Right-sided preauricular cellulitis    Recommendations  Would continue vancomycin and piperacillin/tazobactam, reviewed with pharmacy and safe during pregnancy  Would obtain MRSA PCR, if negative then discontinue vancomycin  Follow pending culture from fluid of the ear  .  Further imaging to be obtained, MRI IAC–caution with gadolinium during pregnancy, would discuss with radiology and ENT  Follow fever curve and WBC count    José Manuel Fontenot MD  Division of Infectious Diseases

## 2024-04-19 NOTE — ED PROVIDER NOTE - PROGRESS NOTE DETAILS
I spoke with the ENT who recommended IV Zosyn for chondritis/perichondritis, infectious disease consult, and CT versus MRI to evaluate for mastoiditis.  Patient will require admission for IV antibiotics.  I spoke with the patient who requested MRI due to concerns for radiation.  No order sent for patient and infectious disease consulted.    Harinder Pichardo MD (PGY 1) Patient will be admitted. Hospitalist agreed to admit the patient.    Harinder Pichardo MD (PGY 1)

## 2024-04-19 NOTE — H&P ADULT - HISTORY OF PRESENT ILLNESS
PADMINI MORALES is a 38y Female with no significant past medical history presenting for right ear pain and swelling for the past 3 days.    Patient states that about 3 days ago, she started to notice her ear was itchy and then started to become more red, swollen, and painful. Started to notice yellow discharge draining out of her ear. Patient was given polymyxin and neomycin eardrops with no relief. The day prior to presentation, she started to feel pain in the preauricular area, postauricular area, and pain when opening mouth and chewing prompting her to present to ED.  Patient denies fevers, chills, body aches, throat pain, difficulty breathing, difficulty swallowing, recent sick contacts.    ED Course:  Vital Signs on Presentation: Temp: 98.2 F ( 36.8 C) | BP: 117/70 mmHg | HR: 95 | RR: 16 | SpO2: 95% RA  Imaging:  - POCUS head and neck soft tissue: No abscess visualized  - POCUS pregnancy: Live IUP  Medications Given: Zosyn, vancomycin

## 2024-04-19 NOTE — H&P ADULT - ASSESSMENT
PADMINI MORALES is a 38y Female with no significant past medical history presenting for right ear pain and swelling for the past 3 days. Concern for mastoiditis, initiated on zosyn and vancomycin.

## 2024-04-19 NOTE — ED ADULT NURSE REASSESSMENT NOTE - NS ED NURSE REASSESS COMMENT FT1
patient AOX4 , c/o pain to right ear, breathing even and unlabored. NAD. admitted. 17 weeks pregnant, endorses good baby movements. ROLDAN september 18. admitted. pending bed.

## 2024-04-19 NOTE — CONSULT NOTE ADULT - PROBLEM SELECTOR RECOMMENDATION 3
- Please admit to medicine for IV antibiotics  - S/p wick placement  - ciprodex gtts 5 gtts to ear BID for 2 weeks  - Culture obtained and sent to lab  - Can start zosyn right now  - ID consult appreciated  - NSAIDs for pain  - Do not submerge head/water under water  - Water precaution when showering to avoid any water get into the ears by placing cotton ball in the outter ear cover by a layer of petroleum jelly - Please admit to medicine for IV antibiotics  - S/p wick placement  - ciprodex gtts 5 gtts to ear BID for 2 weeks  - Culture obtained and sent to lab  - Can start zosyn right now  - ID consult appreciated  - NSAIDs for pain  - Do not submerge head/water under water  - Water precaution when showering to avoid any water get into the ears by placing cotton ball in the outter ear cover by a layer of petroleum jelly  - Case discussed with Dr. Austin

## 2024-04-19 NOTE — ED PROVIDER NOTE - OBJECTIVE STATEMENT
38-year-old female G3, P2 17 weeks pregnant presenting to the emergency department due to right ear pain and swelling for the past 3 days.  Patient was given polymyxin and neomycin eardrops with no relief, now she has she has pain in the preauricular area, postauricular area, and pain when opening mouth.  Patient denies fevers, chills, body aches, throat pain, difficulty breathing, difficulty swallowing.

## 2024-04-19 NOTE — CONSULT NOTE ADULT - PROBLEM SELECTOR RECOMMENDATION 9
- Please admit to medicine for IV antibiotics  - ID consult appreciated  - Can start zosyn right now - Please admit to medicine for IV antibiotics  - ID consult appreciated  - Can start zosyn right now  - Please put mupirocin ointment BID for 7 days  - Case discussed with Dr. Austin

## 2024-04-19 NOTE — H&P ADULT - NSHPREVIEWOFSYSTEMS_GEN_ALL_CORE
CONSTITUTIONAL: No weakness, fevers or chills  EYES/ENT: + Ear pain, yellow dischareg. No visual changes;  No vertigo or throat pain   NECK: No pain or stiffness  RESPIRATORY: No cough, wheezing, hemoptysis; No shortness of breath  CARDIOVASCULAR: No chest pain or palpitations  GASTROINTESTINAL: No abdominal or epigastric pain. No nausea, vomiting, or hematemesis; No diarrhea or constipation. No melena or hematochezia.  GENITOURINARY: No dysuria, frequency or hematuria  NEUROLOGICAL: No numbness or weakness  SKIN: No itching, burning, rashes, or lesions   PSYCH: no hx depression, no hx anxiety

## 2024-04-20 ENCOUNTER — TRANSCRIPTION ENCOUNTER (OUTPATIENT)
Age: 39
End: 2024-04-20

## 2024-04-20 LAB
ANION GAP SERPL CALC-SCNC: 13 MMOL/L — SIGNIFICANT CHANGE UP (ref 7–14)
BASOPHILS # BLD AUTO: 0.03 K/UL — SIGNIFICANT CHANGE UP (ref 0–0.2)
BASOPHILS NFR BLD AUTO: 0.3 % — SIGNIFICANT CHANGE UP (ref 0–2)
BUN SERPL-MCNC: 7 MG/DL — SIGNIFICANT CHANGE UP (ref 7–23)
CALCIUM SERPL-MCNC: 8.8 MG/DL — SIGNIFICANT CHANGE UP (ref 8.4–10.5)
CHLORIDE SERPL-SCNC: 101 MMOL/L — SIGNIFICANT CHANGE UP (ref 98–107)
CO2 SERPL-SCNC: 21 MMOL/L — LOW (ref 22–31)
CREAT SERPL-MCNC: 0.44 MG/DL — LOW (ref 0.5–1.3)
CRP SERPL-MCNC: 111.9 MG/L — HIGH
EGFR: 125 ML/MIN/1.73M2 — SIGNIFICANT CHANGE UP
EOSINOPHIL # BLD AUTO: 0.08 K/UL — SIGNIFICANT CHANGE UP (ref 0–0.5)
EOSINOPHIL NFR BLD AUTO: 0.9 % — SIGNIFICANT CHANGE UP (ref 0–6)
ERYTHROCYTE [SEDIMENTATION RATE] IN BLOOD: 75 MM/HR — HIGH (ref 4–25)
GLUCOSE SERPL-MCNC: 85 MG/DL — SIGNIFICANT CHANGE UP (ref 70–99)
HCT VFR BLD CALC: 33.8 % — LOW (ref 34.5–45)
HGB BLD-MCNC: 11.4 G/DL — LOW (ref 11.5–15.5)
IANC: 6.38 K/UL — SIGNIFICANT CHANGE UP (ref 1.8–7.4)
IMM GRANULOCYTES NFR BLD AUTO: 0.4 % — SIGNIFICANT CHANGE UP (ref 0–0.9)
LYMPHOCYTES # BLD AUTO: 1.55 K/UL — SIGNIFICANT CHANGE UP (ref 1–3.3)
LYMPHOCYTES # BLD AUTO: 17 % — SIGNIFICANT CHANGE UP (ref 13–44)
MAGNESIUM SERPL-MCNC: 2 MG/DL — SIGNIFICANT CHANGE UP (ref 1.6–2.6)
MCHC RBC-ENTMCNC: 27.7 PG — SIGNIFICANT CHANGE UP (ref 27–34)
MCHC RBC-ENTMCNC: 33.7 GM/DL — SIGNIFICANT CHANGE UP (ref 32–36)
MCV RBC AUTO: 82 FL — SIGNIFICANT CHANGE UP (ref 80–100)
MONOCYTES # BLD AUTO: 1.06 K/UL — HIGH (ref 0–0.9)
MONOCYTES NFR BLD AUTO: 11.6 % — SIGNIFICANT CHANGE UP (ref 2–14)
MRSA PCR RESULT.: SIGNIFICANT CHANGE UP
NEUTROPHILS # BLD AUTO: 6.38 K/UL — SIGNIFICANT CHANGE UP (ref 1.8–7.4)
NEUTROPHILS NFR BLD AUTO: 69.8 % — SIGNIFICANT CHANGE UP (ref 43–77)
NRBC # BLD: 0 /100 WBCS — SIGNIFICANT CHANGE UP (ref 0–0)
NRBC # FLD: 0 K/UL — SIGNIFICANT CHANGE UP (ref 0–0)
PHOSPHATE SERPL-MCNC: 3.8 MG/DL — SIGNIFICANT CHANGE UP (ref 2.5–4.5)
PLATELET # BLD AUTO: 226 K/UL — SIGNIFICANT CHANGE UP (ref 150–400)
POTASSIUM SERPL-MCNC: 3.3 MMOL/L — LOW (ref 3.5–5.3)
POTASSIUM SERPL-SCNC: 3.3 MMOL/L — LOW (ref 3.5–5.3)
RBC # BLD: 4.12 M/UL — SIGNIFICANT CHANGE UP (ref 3.8–5.2)
RBC # FLD: 14.2 % — SIGNIFICANT CHANGE UP (ref 10.3–14.5)
S AUREUS DNA NOSE QL NAA+PROBE: SIGNIFICANT CHANGE UP
SODIUM SERPL-SCNC: 135 MMOL/L — SIGNIFICANT CHANGE UP (ref 135–145)
WBC # BLD: 9.14 K/UL — SIGNIFICANT CHANGE UP (ref 3.8–10.5)
WBC # FLD AUTO: 9.14 K/UL — SIGNIFICANT CHANGE UP (ref 3.8–10.5)

## 2024-04-20 PROCEDURE — 99232 SBSQ HOSP IP/OBS MODERATE 35: CPT | Mod: GC

## 2024-04-20 RX ORDER — POTASSIUM CHLORIDE 20 MEQ
40 PACKET (EA) ORAL ONCE
Refills: 0 | Status: COMPLETED | OUTPATIENT
Start: 2024-04-20 | End: 2024-04-20

## 2024-04-20 RX ADMIN — Medication 81 MILLIGRAM(S): at 12:31

## 2024-04-20 RX ADMIN — MUPIROCIN 1 APPLICATION(S): 20 OINTMENT TOPICAL at 21:48

## 2024-04-20 RX ADMIN — Medication 1 TABLET(S): at 12:32

## 2024-04-20 RX ADMIN — Medication 650 MILLIGRAM(S): at 12:32

## 2024-04-20 RX ADMIN — Medication 650 MILLIGRAM(S): at 13:32

## 2024-04-20 RX ADMIN — MUPIROCIN 1 APPLICATION(S): 20 OINTMENT TOPICAL at 09:32

## 2024-04-20 RX ADMIN — Medication 250 MILLIGRAM(S): at 23:26

## 2024-04-20 RX ADMIN — PIPERACILLIN AND TAZOBACTAM 25 GRAM(S): 4; .5 INJECTION, POWDER, LYOPHILIZED, FOR SOLUTION INTRAVENOUS at 12:32

## 2024-04-20 RX ADMIN — Medication 250 MILLIGRAM(S): at 09:37

## 2024-04-20 RX ADMIN — Medication 40 MILLIEQUIVALENT(S): at 12:32

## 2024-04-20 RX ADMIN — Medication 5 DROP(S): at 09:32

## 2024-04-20 RX ADMIN — PIPERACILLIN AND TAZOBACTAM 25 GRAM(S): 4; .5 INJECTION, POWDER, LYOPHILIZED, FOR SOLUTION INTRAVENOUS at 02:54

## 2024-04-20 RX ADMIN — Medication 5 DROP(S): at 21:48

## 2024-04-20 RX ADMIN — PIPERACILLIN AND TAZOBACTAM 25 GRAM(S): 4; .5 INJECTION, POWDER, LYOPHILIZED, FOR SOLUTION INTRAVENOUS at 19:47

## 2024-04-20 NOTE — DISCHARGE NOTE PROVIDER - NSDCCPTREATMENT_GEN_ALL_CORE_FT
PRINCIPAL PROCEDURE  Procedure: US head  Findings and Treatment: INTERPRETATION: CLINICAL HISTORY: Swelling in the right periauricular region, anterior to the right ear.  COMPARISON: NONE  TECHNIQUE: Focused grayscale sonographic imaging of the right periauricular region was performed along with color doppler evaluation.  FINDINGS/  IMPRESSION:  Focused evaluation at the site of clinical concern reveals a normal lymph node of 6 mm short axis. No fluid collection.

## 2024-04-20 NOTE — DISCHARGE NOTE PROVIDER - HOSPITAL COURSE
HPI:  PADMINI MORALES is a 38y Female with no significant past medical history presenting for right ear pain and swelling for the past 3 days.    Patient states that about 3 days ago, she started to notice her ear was itchy and then started to become more red, swollen, and painful. Started to notice yellow discharge draining out of her ear. Patient was given polymyxin and neomycin eardrops with no relief. The day prior to presentation, she started to feel pain in the preauricular area, postauricular area, and pain when opening mouth and chewing prompting her to present to ED.  Patient denies fevers, chills, body aches, throat pain, difficulty breathing, difficulty swallowing, recent sick contacts.    ED Course:  Vital Signs on Presentation: Temp: 98.2 F ( 36.8 C) | BP: 117/70 mmHg | HR: 95 | RR: 16 | SpO2: 95% RA  Imaging:  - POCUS head and neck soft tissue: No abscess visualized  - POCUS pregnancy: Live IUP  Medications Given: Zosyn, vancomycin (19 Apr 2024 18:45)    Hospital Course:  Patient admitted to medicine and continued on Zosyn and vancomycin, along with cipro-dex ear drop. Medications reviewed with pharmacy for teratogenicity prior to administration. Patient showing significant improvement in ear pain, swelling, and erythema on day 2 of abx, after which it was deemed no further MR imaging needed and no surgical intervention warranted per ENT. Cultures growing ____. Patient discharged with a ___ day course of ____.      Important Medication Changes and Reason:    Active or Pending Issues Requiring Follow-up:  - Mastoiditis    Advanced Directives:   [X] Full code  [ ] DNR  [ ] Hospice    Discharge Diagnoses:  - Mastoiditis HPI:  PADMINI MORALES is a 38y Female with no significant past medical history presenting for right ear pain and swelling for the past 3 days.    Patient states that about 3 days ago, she started to notice her ear was itchy and then started to become more red, swollen, and painful. Started to notice yellow discharge draining out of her ear. Patient was given polymyxin and neomycin eardrops with no relief. The day prior to presentation, she started to feel pain in the preauricular area, postauricular area, and pain when opening mouth and chewing prompting her to present to ED.  Patient denies fevers, chills, body aches, throat pain, difficulty breathing, difficulty swallowing, recent sick contacts.    ED Course:  Vital Signs on Presentation: Temp: 98.2 F ( 36.8 C) | BP: 117/70 mmHg | HR: 95 | RR: 16 | SpO2: 95% RA  Imaging:  - POCUS head and neck soft tissue: No abscess visualized  - POCUS pregnancy: Live IUP  Medications Given: Zosyn, vancomycin (19 Apr 2024 18:45)    Hospital Course:  Patient admitted to medicine and continued on Zosyn and vancomycin, along with cipro-dex ear drop. Medications reviewed with pharmacy for teratogenicity prior to administration. Patient showing significant improvement in ear pain, swelling, and erythema on day 2 of abx, after which it was deemed no further MR imaging needed and no surgical intervention warranted per ENT. Cultures growing psuedomonas, klebsiella, and staph aureus. Patient discharged with a ___ day course of ____.      Important Medication Changes and Reason:    Active or Pending Issues Requiring Follow-up:  - Mastoiditis    Advanced Directives:   [X] Full code  [ ] DNR  [ ] Hospice    Discharge Diagnoses:  - Mastoiditis HPI:  PADMINI MORALES is a 38y Female with no significant past medical history presenting for right ear pain and swelling for the past 3 days.    Patient states that about 3 days ago, she started to notice her ear was itchy and then started to become more red, swollen, and painful. Started to notice yellow discharge draining out of her ear. Patient was given polymyxin and neomycin eardrops with no relief. The day prior to presentation, she started to feel pain in the preauricular area, postauricular area, and pain when opening mouth and chewing prompting her to present to ED.  Patient denies fevers, chills, body aches, throat pain, difficulty breathing, difficulty swallowing, recent sick contacts.    ED Course:  Vital Signs on Presentation: Temp: 98.2 F ( 36.8 C) | BP: 117/70 mmHg | HR: 95 | RR: 16 | SpO2: 95% RA  Imaging:  - POCUS head and neck soft tissue: No abscess visualized  - POCUS pregnancy: Live IUP  Medications Given: Zosyn, vancomycin (19 Apr 2024 18:45)    Hospital Course:  Patient admitted to medicine and continued on Zosyn and vancomycin, along with cipro-dex ear drop. Medications reviewed with pharmacy for teratogenicity prior to administration. Patient showing significant improvement in ear pain, swelling, and erythema on day 2 of abx, after which it was deemed no further MR imaging needed and no surgical intervention warranted per ENT. Cultures growing psuedomonas, klebsiella, and staph aureus. Patient discharged to complete a 6 week course of cefepime 2g q8h, may be shortened to 4 weeks depending on clinical response.      Important Medication Changes and Reason:  - Cefepime 2g q8h for 4-6 weeks    Active or Pending Issues Requiring Follow-up:  - Mastoiditis    Advanced Directives:   [X] Full code  [ ] DNR  [ ] Hospice    Discharge Diagnoses:  - Mastoiditis HPI:  PADMINI MORALES is a 38y Female with no significant past medical history presenting for right ear pain and swelling for the past 3 days.    Patient states that about 3 days ago, she started to notice her ear was itchy and then started to become more red, swollen, and painful. Started to notice yellow discharge draining out of her ear. Patient was given polymyxin and neomycin eardrops with no relief. The day prior to presentation, she started to feel pain in the preauricular area, postauricular area, and pain when opening mouth and chewing prompting her to present to ED.  Patient denies fevers, chills, body aches, throat pain, difficulty breathing, difficulty swallowing, recent sick contacts.    ED Course:  Vital Signs on Presentation: Temp: 98.2 F ( 36.8 C) | BP: 117/70 mmHg | HR: 95 | RR: 16 | SpO2: 95% RA  Imaging:  - POCUS head and neck soft tissue: No abscess visualized  - POCUS pregnancy: Live IUP  Medications Given: Zosyn, vancomycin (19 Apr 2024 18:45)    Hospital Course:  Patient admitted to medicine and continued on Zosyn, along with cipro-dex ear drop. Medications reviewed with pharmacy for teratogenicity prior to administration. Patient showing significant improvement in ear pain, swelling, and erythema on day 2 of abx, after which it was deemed no further MR imaging needed and no surgical intervention warranted per ENT. Per ENT low concern for Mastoiditis. Cultures growing psuedomonas, klebsiella, and staph aureus. Patient discharged to complete a 2 week course of cefepime 2g q8h, may be shortened to 4 weeks depending on clinical response.      Important Medication Changes and Reason:  - Cefepime 2g q8h for 2 weeks    Active or Pending Issues Requiring Follow-up:  - Otitis Externa     Advanced Directives:   [X] Full code  [ ] DNR  [ ] Hospice    Discharge Diagnoses:  - Otitis Externa HPI:  PADMINI MORALES is a 38y Female with no significant past medical history presenting for right ear pain and swelling for the past 3 days.    Patient states that about 3 days ago, she started to notice her ear was itchy and then started to become more red, swollen, and painful. Started to notice yellow discharge draining out of her ear. Patient was given polymyxin and neomycin eardrops with no relief. The day prior to presentation, she started to feel pain in the preauricular area, postauricular area, and pain when opening mouth and chewing prompting her to present to ED.  Patient denies fevers, chills, body aches, throat pain, difficulty breathing, difficulty swallowing, recent sick contacts.    ED Course:  Vital Signs on Presentation: Temp: 98.2 F ( 36.8 C) | BP: 117/70 mmHg | HR: 95 | RR: 16 | SpO2: 95% RA  Imaging:  - POCUS head and neck soft tissue: No abscess visualized  - POCUS pregnancy: Live IUP  Medications Given: Zosyn, vancomycin (19 Apr 2024 18:45)    Hospital Course:  Patient admitted to medicine and continued on Zosyn, along with cipro-dex ear drop. Medications reviewed with pharmacy for teratogenicity prior to administration. Patient showing significant improvement in ear pain, swelling, and erythema on day 2 of abx, after which it was deemed no further MR imaging needed and no surgical intervention warranted per ENT. Per ENT low concern for Mastoiditis. Cultures growing psuedomonas, klebsiella, and staph aureus. Patient discharged to complete a 2 week course of cefepime 2g q8h, may be shortened to 4 weeks depending on clinical response. Patient to follow up with ENT and ID. Midline to be removed in subsequent clinic visit.       Important Medication Changes and Reason:  - Cefepime 2g q8h for 2 weeks    Active or Pending Issues Requiring Follow-up:  - Otitis Externa     Advanced Directives:   [X] Full code  [ ] DNR  [ ] Hospice    Discharge Diagnoses:  - Otitis Externa

## 2024-04-20 NOTE — DISCHARGE NOTE PROVIDER - DISCHARGING ATTENDING PHYSICIAN:
Left message for the patient that her problem is not being caused by the hiatal hernia. She should follow-up with the physicians as recommended. Ashutosh Mortensen MD

## 2024-04-20 NOTE — PROGRESS NOTE ADULT - ASSESSMENT
38F  17wkg pw R ear pain c/w severe OE with auricular/periauricular cellulitis. Right EAC with improving edema, wick replaced, unable to fully visualize TM. No fluctuance. US negative for collection.     - continue ciprodex drops  - continue systemic abx per ID  - ENT to re-examine tomorrow

## 2024-04-20 NOTE — DISCHARGE NOTE PROVIDER - CARE PROVIDER_API CALL
Rey Quevedo.  Infectious Disease  [address] [place] [id number]  Phone: [phone]  Fax: [phone]  Follow Up Time: 1 month

## 2024-04-20 NOTE — DISCHARGE NOTE PROVIDER - NSDCCPCAREPLAN_GEN_ALL_CORE_FT
PRINCIPAL DISCHARGE DIAGNOSIS  Diagnosis: Mastoiditis  Assessment and Plan of Treatment: You were diagnosed with an infection of the bone surrounding the ear, which we call mastoiditis. You received a combination of antibiotics called vancomycin and zosyn while inpatient. Left untreated, this type of infection could quickly progress and cause life-threatening complications. Please make sure to complete the recommended antibiotic course upon discharge and to follo-up closely outpatient.     PRINCIPAL DISCHARGE DIAGNOSIS  Diagnosis: Otitis externa  Assessment and Plan of Treatment: You were diagnosed with an infection of the external right ear, which we call otitis externa. You received IV antibiotics while you were in the hospital along with antibiotic and steroid ear drops. In order to ensure that the infection is completely treated, you will need to complete 2 week total course of IV antibiotics along with the ear drops. You will be discharged with an IV that will allow you complete the IV antibiotics outpatient. Please complete the course of antibiotics as prescribed. Please follow up with your primary care provider, ENT and infectious disease within 2 weeks of discharge.     PRINCIPAL DISCHARGE DIAGNOSIS  Diagnosis: Otitis externa  Assessment and Plan of Treatment: You were diagnosed with an infection of the external right ear, which we call otitis externa. You received IV antibiotics while you were in the hospital along with antibiotic and steroid ear drops. In order to ensure that the infection is completely treated, you will need to complete 2 week total course of IV antibiotics along with the ear drops. You will be discharged with an IV that will allow you complete the IV antibiotics outpatient. Please complete the course of antibiotics as prescribed. Please follow up with your primary care provider, ENT and infectious disease within 2 weeks of discharge.  Please be sure to let your providers know you have a midline. If ID or ENT decide the antibiotic course is finished, please have this removed in clinic.      SECONDARY DISCHARGE DIAGNOSES  Diagnosis: Pregnant  Assessment and Plan of Treatment: Please see your Ob doctors asap to follow up

## 2024-04-20 NOTE — DISCHARGE NOTE PROVIDER - NSDCMRMEDTOKEN_GEN_ALL_CORE_FT
aspirin 81 mg oral delayed release tablet: 1 tab(s) orally once a day  Prenatal Multivitamins oral tablet: orally once a day   aspirin 81 mg oral delayed release tablet: 1 tab(s) orally once a day  Complete Blood Count: Please draw WEEKLY and fax results to Dr. Rey Quevedo at [phone]  Comprehensive Metabolic Panel: Please draw WEEKLY and fax results to Dr. Rey Quevedo at [phone]  Prenatal Multivitamins oral tablet: orally once a day   aspirin 81 mg oral delayed release tablet: 1 tab(s) orally once a day  cefepime 2 g/100 mL intravenous solution: 2 gram(s) intravenously 3 times a day  Complete Blood Count: Please draw WEEKLY and fax results to Dr. Rey Quevedo at [phone]  Comprehensive Metabolic Panel: Please draw WEEKLY and fax results to Dr. Rey Quevedo at [phone]  Prenatal Multivitamins oral tablet: orally once a day   aspirin 81 mg oral delayed release tablet: 1 tab(s) orally once a day  cefepime 2 g/100 mL intravenous solution: 2 gram(s) intravenously every 12 hours  ciprofloxacin-dexamethasone 0.3%-0.1% otic suspension: 5 drop(s) in the right ear 2 times a day  Complete Blood Count: Please draw WEEKLY and fax results to Dr. Rey Quevedo at [phone]  Comprehensive Metabolic Panel: Please draw WEEKLY and fax results to Dr. Rey Quevedo at [phone]  Prenatal Multivitamins oral tablet: orally once a day

## 2024-04-20 NOTE — PROGRESS NOTE ADULT - SUBJECTIVE AND OBJECTIVE BOX
ENT Progress Note    Interval: examined at bedside.l NAEON. AVSS. Patient reports pain is slightly improving. right ear wick replaced        PAST MEDICAL & SURGICAL HISTORY:  No pertinent past medical history      No significant past surgical history        Allergies    No Known Allergies    Intolerances      MEDICATIONS  (STANDING):  aspirin  chewable 81 milliGRAM(s) Oral daily  ciprofloxacin  0.3% Ophthalmic Solution for Otic Use 5 Drop(s) Right Ear two times a day  dexAMETHasone 0.1% Ophthalmic Solution for OTIC Use 5 Drop(s) Right Ear two times a day  mupirocin 2% Ointment 1 Application(s) Topical two times a day  piperacillin/tazobactam IVPB.. 3.375 Gram(s) IV Intermittent every 8 hours  prenatal multivitamin 1 Tablet(s) Oral daily  vancomycin  IVPB 1000 milliGRAM(s) IV Intermittent every 12 hours    MEDICATIONS  (PRN):  acetaminophen     Tablet .. 650 milliGRAM(s) Oral every 6 hours PRN Temp greater or equal to 38C (100.4F), Mild Pain (1 - 3)        Vital Signs Last 24 Hrs  T(C): 36.6 (20 Apr 2024 12:34), Max: 36.6 (20 Apr 2024 12:34)  T(F): 97.9 (20 Apr 2024 12:34), Max: 97.9 (20 Apr 2024 12:34)  HR: 81 (20 Apr 2024 12:34) (81 - 93)  BP: 102/60 (20 Apr 2024 12:34) (102/60 - 102/63)  BP(mean): --  RR: 18 (20 Apr 2024 12:34) (17 - 18)  SpO2: 100% (20 Apr 2024 12:34) (99% - 100%)    Parameters below as of 20 Apr 2024 12:34  Patient On (Oxygen Delivery Method): room air      PHYSICAL EXAM:  Gen: NAD  Skin: No rashes, bruises, or lesions  Head: Normocephalic, Atraumatic  Face: no edema, erythema, or fluctuance. Parotid glands soft without mass  Eyes: no scleral injection  Ears: Right - External cartilage of ear edematous and erythematous, warmth to touch. Preauricular ear fullness, Right EAC edema, unable to fully see TM. No active fluid drainage presents. Mastoid tenderness and erythema, slight ear bulging.            Left - ear canal clear, TM intact without effusion or erythema. No evidence of any fluid drainage. No mastoid tenderness, erythema, or ear bulging  Nose: Nares bilaterally patent, no discharge  Mouth: No stridor, no drooling, no trismus.  Mucosa moist, tongue/uvula midline, oropharynx clear  Neck: Flat, supple, no lymphadenopathy, trachea midline, no masses  Lymphatic: No lymphadenopathy  Resp: breathing easily, no stridor  CV: no peripheral edema/cyanosis  GI: nondistended   Peripheral vascular: no JVD or edema  Neuro: facial nerve intact, no facial droop                            11.4   9.14  )-----------( 226      ( 20 Apr 2024 07:39 )             33.8    04-20    135  |  101  |  7   ----------------------------<  85  3.3<L>   |  21<L>  |  0.44<L>    Ca    8.8      20 Apr 2024 07:39  Phos  3.8     04-20  Mg     2.00     04-20    TPro  7.5  /  Alb  4.0  /  TBili  0.5  /  DBili  x   /  AST  15  /  ALT  17  /  AlkPhos  115  04-19   PT/INR - ( 19 Apr 2024 12:25 )   PT: 11.6 sec;   INR: 1.04 ratio         PTT - ( 19 Apr 2024 12:25 )  PTT:29.9 sec

## 2024-04-20 NOTE — PROGRESS NOTE ADULT - PROBLEM SELECTOR PLAN 2
. 17 weeks pregnant.  - Avoid teratogens and radiation.  - C/w prenatal vitamins  - C/w ASA 81 for preeclampsia . 17 weeks pregnant. Medications reviewed with pharmacy.  - Avoid teratogens and radiation.  - C/w prenatal vitamins  - C/w ASA 81 for preeclampsia

## 2024-04-20 NOTE — PROGRESS NOTE ADULT - SUBJECTIVE AND OBJECTIVE BOX
Patient is a 38y old  Female who presents with a chief complaint of Mastoiditis    SUBJECTIVE / OVERNIGHT EVENTS: Patient seen and examined at bedside. No overnight events.    MEDICATIONS  (STANDING):  aspirin  chewable 81 milliGRAM(s) Oral daily  ciprofloxacin  0.3% Ophthalmic Solution for Otic Use 5 Drop(s) Right Ear two times a day  dexAMETHasone 0.1% Ophthalmic Solution for OTIC Use 5 Drop(s) Right Ear two times a day  mupirocin 2% Ointment 1 Application(s) Topical two times a day  piperacillin/tazobactam IVPB.. 3.375 Gram(s) IV Intermittent every 8 hours  prenatal multivitamin 1 Tablet(s) Oral daily  vancomycin  IVPB 1000 milliGRAM(s) IV Intermittent every 12 hours    MEDICATIONS  (PRN):  acetaminophen     Tablet .. 650 milliGRAM(s) Oral every 6 hours PRN Temp greater or equal to 38C (100.4F), Mild Pain (1 - 3)    Vital Signs Last 24 Hrs  T(C): 36.4 (20 Apr 2024 05:09), Max: 37 (19 Apr 2024 12:19)  T(F): 97.6 (20 Apr 2024 05:09), Max: 98.6 (19 Apr 2024 12:19)  HR: 93 (20 Apr 2024 05:09) (86 - 105)  BP: 102/63 (20 Apr 2024 06:00) (102/63 - 138/70)  BP(mean): 79 (19 Apr 2024 16:28) (79 - 79)  RR: 17 (20 Apr 2024 05:09) (16 - 17)  SpO2: 99% (20 Apr 2024 05:09) (96% - 99%)    Parameters below as of 20 Apr 2024 05:09  Patient On (Oxygen Delivery Method): room air    CAPILLARY BLOOD GLUCOSE    I&O's Summary    PHYSICAL EXAM:  GENERAL: NAD, Resting in bed  Eyes: EOMI, PERRLA, conjunctiva and sclera clear  HENT:  + Erythema, swelling, and yellow discharge from R ear in pre and post auricular area. Head atraumatic; Moist mucous membranes, normal oropharynx  NECK: Supple, No JVD, no lymphadenopathy, no thyroid nodules or enlargement  CHEST/LUNG: Clear to auscultation bilaterally; No rales, rhonchi, wheezing, or rubs. Unlabored respirations on room air  HEART: Regular rate and rhythm; No murmurs, rubs, or gallops  ABDOMEN: Bowel sounds present; Soft, Nontender, Nondistended.   EXTREMITIES:  2+ Peripheral Pulses, brisk capillary refill. No clubbing, cyanosis, or edema  NERVOUS SYSTEM:  Alert & Oriented X3, non-focal and spontaneous movements of all extremities  SKIN: No rashes or lesions  Psych: Normal behavior, normal affect, normal speech    LABS:                        11.7   10.89 )-----------( 235      ( 19 Apr 2024 12:25 )             35.7     04-19    135  |  98  |  8   ----------------------------<  89  3.4<L>   |  22  |  0.37<L>    Ca    9.3      19 Apr 2024 12:25    TPro  7.5  /  Alb  4.0  /  TBili  0.5  /  DBili  x   /  AST  15  /  ALT  17  /  AlkPhos  115  04-19    PT/INR - ( 19 Apr 2024 12:25 )   PT: 11.6 sec;   INR: 1.04 ratio      PTT - ( 19 Apr 2024 12:25 )  PTT:29.9 sec    Urinalysis Basic - ( 19 Apr 2024 12:25 )    Color: x / Appearance: x / SG: x / pH: x  Gluc: 89 mg/dL / Ketone: x  / Bili: x / Urobili: x   Blood: x / Protein: x / Nitrite: x   Leuk Esterase: x / RBC: x / WBC x   Sq Epi: x / Non Sq Epi: x / Bacteria: x    RADIOLOGY & ADDITIONAL TESTS:    Imaging Personally Reviewed:    Consultant(s) Notes Reviewed:      Care Discussed with Consultants/Other Providers:    Robby Hoffman MD, Internal Medicine Resident Patient is a 38y old  Female who presents with a chief complaint of Mastoiditis    SUBJECTIVE / OVERNIGHT EVENTS: Patient seen and examined at bedside. No overnight events. Patient reporting improvement in R ear pain and swelling, and resolution of yellow discharge. Denies CP, SOB, subjective fever, chills, n/v/d.    MEDICATIONS  (STANDING):  aspirin  chewable 81 milliGRAM(s) Oral daily  ciprofloxacin  0.3% Ophthalmic Solution for Otic Use 5 Drop(s) Right Ear two times a day  dexAMETHasone 0.1% Ophthalmic Solution for OTIC Use 5 Drop(s) Right Ear two times a day  mupirocin 2% Ointment 1 Application(s) Topical two times a day  piperacillin/tazobactam IVPB.. 3.375 Gram(s) IV Intermittent every 8 hours  prenatal multivitamin 1 Tablet(s) Oral daily  vancomycin  IVPB 1000 milliGRAM(s) IV Intermittent every 12 hours    MEDICATIONS  (PRN):  acetaminophen     Tablet .. 650 milliGRAM(s) Oral every 6 hours PRN Temp greater or equal to 38C (100.4F), Mild Pain (1 - 3)    Vital Signs Last 24 Hrs  T(C): 36.4 (20 Apr 2024 05:09), Max: 37 (19 Apr 2024 12:19)  T(F): 97.6 (20 Apr 2024 05:09), Max: 98.6 (19 Apr 2024 12:19)  HR: 93 (20 Apr 2024 05:09) (86 - 105)  BP: 102/63 (20 Apr 2024 06:00) (102/63 - 138/70)  BP(mean): 79 (19 Apr 2024 16:28) (79 - 79)  RR: 17 (20 Apr 2024 05:09) (16 - 17)  SpO2: 99% (20 Apr 2024 05:09) (96% - 99%)    Parameters below as of 20 Apr 2024 05:09  Patient On (Oxygen Delivery Method): room air    CAPILLARY BLOOD GLUCOSE    I&O's Summary    PHYSICAL EXAM:  GENERAL: NAD, Resting in bed  Eyes: EOMI, PERRLA, conjunctiva and sclera clear  HENT:  + Erythema, swelling from R ear in pre and post auricular area. Head atraumatic; Moist mucous membranes, normal oropharynx  NECK: Supple, No JVD, no lymphadenopathy, no thyroid nodules or enlargement  CHEST/LUNG: Clear to auscultation bilaterally; No rales, rhonchi, wheezing, or rubs. Unlabored respirations on room air  HEART: Regular rate and rhythm; No murmurs, rubs, or gallops  ABDOMEN: Bowel sounds present; Soft, Nontender, Nondistended.   EXTREMITIES:  2+ Peripheral Pulses, brisk capillary refill. No clubbing, cyanosis, or edema  NERVOUS SYSTEM:  Alert & Oriented X3, non-focal and spontaneous movements of all extremities  SKIN: No rashes or lesions  Psych: Normal behavior, normal affect, normal speech    LABS:                        11.7   10.89 )-----------( 235      ( 19 Apr 2024 12:25 )             35.7     04-19    135  |  98  |  8   ----------------------------<  89  3.4<L>   |  22  |  0.37<L>    Ca    9.3      19 Apr 2024 12:25    TPro  7.5  /  Alb  4.0  /  TBili  0.5  /  DBili  x   /  AST  15  /  ALT  17  /  AlkPhos  115  04-19    PT/INR - ( 19 Apr 2024 12:25 )   PT: 11.6 sec;   INR: 1.04 ratio      PTT - ( 19 Apr 2024 12:25 )  PTT:29.9 sec    Urinalysis Basic - ( 19 Apr 2024 12:25 )    Color: x / Appearance: x / SG: x / pH: x  Gluc: 89 mg/dL / Ketone: x  / Bili: x / Urobili: x   Blood: x / Protein: x / Nitrite: x   Leuk Esterase: x / RBC: x / WBC x   Sq Epi: x / Non Sq Epi: x / Bacteria: x    RADIOLOGY & ADDITIONAL TESTS:    Imaging Personally Reviewed:    Consultant(s) Notes Reviewed:      Care Discussed with Consultants/Other Providers:    Robby Hoffman MD, Internal Medicine Resident

## 2024-04-20 NOTE — DISCHARGE NOTE PROVIDER - NSFOLLOWUPCLINICS_GEN_ALL_ED_FT
[facility] - ENT  Otolaryngology (ENT)  [address] [place] [address]  Phone: [phone]  Fax:   Follow Up Time: 2 weeks

## 2024-04-20 NOTE — PROGRESS NOTE ADULT - PROBLEM SELECTOR PLAN 1
Patient presented with 3 days of R ear pain, erythema, and discharge. Concern for mastoiditis vs Perichondritis vs chondritis of right pinna vs otitis externa.  - POCUS head and neck and in ED, no abscess noted.  - MRI of head, no gadolinium  - C/w zosyn, vancomycin  - Cipro, dex ear drops  - Mupirocin ointment Patient presented with 3 days of R ear pain, erythema, and discharge. Concern for mastoiditis vs Perichondritis vs chondritis of right pinna vs otitis externa. POCUS head and neck and in ED, no abscess noted  - ENT and ID consulted, recs appreciated  - MRI of head, no gadolinium - not needed  - C/w zosyn, vancomycin  - Cipro, dex ear drops  - Mupirocin ointment  - F/u culture

## 2024-04-21 LAB
-  AMOXICILLIN/CLAVULANIC ACID: SIGNIFICANT CHANGE UP
-  AMPICILLIN/SULBACTAM: SIGNIFICANT CHANGE UP
-  AMPICILLIN: SIGNIFICANT CHANGE UP
-  AZTREONAM: SIGNIFICANT CHANGE UP
-  AZTREONAM: SIGNIFICANT CHANGE UP
-  CEFAZOLIN: SIGNIFICANT CHANGE UP
-  CEFEPIME: SIGNIFICANT CHANGE UP
-  CEFEPIME: SIGNIFICANT CHANGE UP
-  CEFOXITIN: SIGNIFICANT CHANGE UP
-  CEFTAZIDIME: SIGNIFICANT CHANGE UP
-  CEFTRIAXONE: SIGNIFICANT CHANGE UP
-  CIPROFLOXACIN: SIGNIFICANT CHANGE UP
-  CIPROFLOXACIN: SIGNIFICANT CHANGE UP
-  ERTAPENEM: SIGNIFICANT CHANGE UP
-  GENTAMICIN: SIGNIFICANT CHANGE UP
-  IMIPENEM: SIGNIFICANT CHANGE UP
-  IMIPENEM: SIGNIFICANT CHANGE UP
-  LEVOFLOXACIN: SIGNIFICANT CHANGE UP
-  LEVOFLOXACIN: SIGNIFICANT CHANGE UP
-  MEROPENEM: SIGNIFICANT CHANGE UP
-  MEROPENEM: SIGNIFICANT CHANGE UP
-  PIPERACILLIN/TAZOBACTAM: SIGNIFICANT CHANGE UP
-  PIPERACILLIN/TAZOBACTAM: SIGNIFICANT CHANGE UP
-  TOBRAMYCIN: SIGNIFICANT CHANGE UP
-  TRIMETHOPRIM/SULFAMETHOXAZOLE: SIGNIFICANT CHANGE UP
ANION GAP SERPL CALC-SCNC: 14 MMOL/L — SIGNIFICANT CHANGE UP (ref 7–14)
BASOPHILS # BLD AUTO: 0.02 K/UL — SIGNIFICANT CHANGE UP (ref 0–0.2)
BASOPHILS NFR BLD AUTO: 0.2 % — SIGNIFICANT CHANGE UP (ref 0–2)
BUN SERPL-MCNC: 7 MG/DL — SIGNIFICANT CHANGE UP (ref 7–23)
CALCIUM SERPL-MCNC: 8.8 MG/DL — SIGNIFICANT CHANGE UP (ref 8.4–10.5)
CHLORIDE SERPL-SCNC: 102 MMOL/L — SIGNIFICANT CHANGE UP (ref 98–107)
CO2 SERPL-SCNC: 20 MMOL/L — LOW (ref 22–31)
CREAT SERPL-MCNC: 0.5 MG/DL — SIGNIFICANT CHANGE UP (ref 0.5–1.3)
EGFR: 122 ML/MIN/1.73M2 — SIGNIFICANT CHANGE UP
EOSINOPHIL # BLD AUTO: 0.1 K/UL — SIGNIFICANT CHANGE UP (ref 0–0.5)
EOSINOPHIL NFR BLD AUTO: 1.2 % — SIGNIFICANT CHANGE UP (ref 0–6)
GLUCOSE SERPL-MCNC: 76 MG/DL — SIGNIFICANT CHANGE UP (ref 70–99)
HCT VFR BLD CALC: 32 % — LOW (ref 34.5–45)
HGB BLD-MCNC: 10.7 G/DL — LOW (ref 11.5–15.5)
IANC: 5.52 K/UL — SIGNIFICANT CHANGE UP (ref 1.8–7.4)
IMM GRANULOCYTES NFR BLD AUTO: 0.6 % — SIGNIFICANT CHANGE UP (ref 0–0.9)
LYMPHOCYTES # BLD AUTO: 1.57 K/UL — SIGNIFICANT CHANGE UP (ref 1–3.3)
LYMPHOCYTES # BLD AUTO: 19.5 % — SIGNIFICANT CHANGE UP (ref 13–44)
MAGNESIUM SERPL-MCNC: 1.9 MG/DL — SIGNIFICANT CHANGE UP (ref 1.6–2.6)
MCHC RBC-ENTMCNC: 27.8 PG — SIGNIFICANT CHANGE UP (ref 27–34)
MCHC RBC-ENTMCNC: 33.4 GM/DL — SIGNIFICANT CHANGE UP (ref 32–36)
MCV RBC AUTO: 83.1 FL — SIGNIFICANT CHANGE UP (ref 80–100)
METHOD TYPE: SIGNIFICANT CHANGE UP
METHOD TYPE: SIGNIFICANT CHANGE UP
MONOCYTES # BLD AUTO: 0.8 K/UL — SIGNIFICANT CHANGE UP (ref 0–0.9)
MONOCYTES NFR BLD AUTO: 9.9 % — SIGNIFICANT CHANGE UP (ref 2–14)
NEUTROPHILS # BLD AUTO: 5.52 K/UL — SIGNIFICANT CHANGE UP (ref 1.8–7.4)
NEUTROPHILS NFR BLD AUTO: 68.6 % — SIGNIFICANT CHANGE UP (ref 43–77)
NRBC # BLD: 0 /100 WBCS — SIGNIFICANT CHANGE UP (ref 0–0)
NRBC # FLD: 0 K/UL — SIGNIFICANT CHANGE UP (ref 0–0)
PHOSPHATE SERPL-MCNC: 4 MG/DL — SIGNIFICANT CHANGE UP (ref 2.5–4.5)
PLATELET # BLD AUTO: 225 K/UL — SIGNIFICANT CHANGE UP (ref 150–400)
POTASSIUM SERPL-MCNC: 3.6 MMOL/L — SIGNIFICANT CHANGE UP (ref 3.5–5.3)
POTASSIUM SERPL-SCNC: 3.6 MMOL/L — SIGNIFICANT CHANGE UP (ref 3.5–5.3)
RBC # BLD: 3.85 M/UL — SIGNIFICANT CHANGE UP (ref 3.8–5.2)
RBC # FLD: 14.1 % — SIGNIFICANT CHANGE UP (ref 10.3–14.5)
SODIUM SERPL-SCNC: 136 MMOL/L — SIGNIFICANT CHANGE UP (ref 135–145)
VANCOMYCIN TROUGH SERPL-MCNC: <4 UG/ML — LOW (ref 10–20)
WBC # BLD: 8.06 K/UL — SIGNIFICANT CHANGE UP (ref 3.8–10.5)
WBC # FLD AUTO: 8.06 K/UL — SIGNIFICANT CHANGE UP (ref 3.8–10.5)

## 2024-04-21 PROCEDURE — 99232 SBSQ HOSP IP/OBS MODERATE 35: CPT

## 2024-04-21 PROCEDURE — 99232 SBSQ HOSP IP/OBS MODERATE 35: CPT | Mod: GC

## 2024-04-21 RX ORDER — VANCOMYCIN HCL 1 G
1000 VIAL (EA) INTRAVENOUS EVERY 8 HOURS
Refills: 0 | Status: DISCONTINUED | OUTPATIENT
Start: 2024-04-21 | End: 2024-04-21

## 2024-04-21 RX ADMIN — PIPERACILLIN AND TAZOBACTAM 25 GRAM(S): 4; .5 INJECTION, POWDER, LYOPHILIZED, FOR SOLUTION INTRAVENOUS at 04:27

## 2024-04-21 RX ADMIN — Medication 5 DROP(S): at 22:21

## 2024-04-21 RX ADMIN — PIPERACILLIN AND TAZOBACTAM 25 GRAM(S): 4; .5 INJECTION, POWDER, LYOPHILIZED, FOR SOLUTION INTRAVENOUS at 13:08

## 2024-04-21 RX ADMIN — MUPIROCIN 1 APPLICATION(S): 20 OINTMENT TOPICAL at 22:22

## 2024-04-21 RX ADMIN — Medication 5 DROP(S): at 08:21

## 2024-04-21 RX ADMIN — PIPERACILLIN AND TAZOBACTAM 25 GRAM(S): 4; .5 INJECTION, POWDER, LYOPHILIZED, FOR SOLUTION INTRAVENOUS at 22:16

## 2024-04-21 RX ADMIN — MUPIROCIN 1 APPLICATION(S): 20 OINTMENT TOPICAL at 08:21

## 2024-04-21 RX ADMIN — Medication 1 TABLET(S): at 11:22

## 2024-04-21 RX ADMIN — Medication 81 MILLIGRAM(S): at 11:21

## 2024-04-21 RX ADMIN — Medication 250 MILLIGRAM(S): at 12:07

## 2024-04-21 NOTE — PROGRESS NOTE ADULT - SUBJECTIVE AND OBJECTIVE BOX
ENT Progress Note    Interval: Wick removed and replaced this morning. Receiving ciprodex drops. Cx showing pseudomonas, klebsiella & staph pending sensitivities      MEDICATIONS  (STANDING):  aspirin  chewable 81 milliGRAM(s) Oral daily  ciprofloxacin  0.3% Ophthalmic Solution for Otic Use 5 Drop(s) Right Ear two times a day  dexAMETHasone 0.1% Ophthalmic Solution for OTIC Use 5 Drop(s) Right Ear two times a day  mupirocin 2% Ointment 1 Application(s) Topical two times a day  piperacillin/tazobactam IVPB.. 3.375 Gram(s) IV Intermittent every 8 hours  prenatal multivitamin 1 Tablet(s) Oral daily  vancomycin  IVPB 1000 milliGRAM(s) IV Intermittent every 12 hours    MEDICATIONS  (PRN):  acetaminophen     Tablet .. 650 milliGRAM(s) Oral every 6 hours PRN Temp greater or equal to 38C (100.4F), Mild Pain (1 - 3)        Vital Signs Last 24 Hrs  T(C): 36.6 (21 Apr 2024 05:30), Max: 36.6 (20 Apr 2024 12:34)  T(F): 97.9 (21 Apr 2024 05:30), Max: 97.9 (20 Apr 2024 12:34)  HR: 81 (21 Apr 2024 05:30) (75 - 81)  BP: 100/60 (21 Apr 2024 05:30) (97/51 - 102/60)  BP(mean): --  RR: 17 (21 Apr 2024 05:30) (17 - 18)  SpO2: 98% (21 Apr 2024 05:30) (98% - 100%)    Parameters below as of 21 Apr 2024 05:30  Patient On (Oxygen Delivery Method): room air        Physical Exam:  Alert, NAD  Nonlabored Respirations RA, no stridor or stertor   Neck: soft, nt, full FOM   Right ear: improving auricular edema, still ttp and manipulation, scant drainage, wick exchanged as TM still not visible  Neuro: CN II-XII grossly intact                                 10.7   8.06  )-----------( 225      ( 21 Apr 2024 06:30 )             32.0    04-21    136  |  102  |  7   ----------------------------<  76  3.6   |  20<L>  |  0.50    Ca    8.8      21 Apr 2024 06:30  Phos  4.0     04-21  Mg     1.90     04-21    TPro  7.5  /  Alb  4.0  /  TBili  0.5  /  DBili  x   /  AST  15  /  ALT  17  /  AlkPhos  115  04-19   PT/INR - ( 19 Apr 2024 12:25 )   PT: 11.6 sec;   INR: 1.04 ratio         PTT - ( 19 Apr 2024 12:25 )  PTT:29.9 sec

## 2024-04-21 NOTE — PROGRESS NOTE ADULT - ASSESSMENT
38F  17wkg pw R ear pain c/w severe OE with auricular/periauricular cellulitis. Right EAC with improving edema, wick replaced, unable to fully visualize TM. No fluctuance. US negative for collection.     - continue ciprodex drops  - continue systemic abx per ID  - f/u cx sensitivities  - dry ear precautions : do not get water in ear, when showering use a cotton ball with vaseline to keep ear dry   - ENT to follow

## 2024-04-21 NOTE — PROGRESS NOTE ADULT - PROBLEM SELECTOR PLAN 1
Patient presented with 3 days of R ear pain, erythema, and discharge. Concern for mastoiditis vs Perichondritis vs chondritis of right pinna vs otitis externa. POCUS head and neck and in ED, no abscess noted  - ENT and ID consulted, recs appreciated  - MRI of head, no gadolinium - not needed  - C/w zosyn, vancomycin  - Cipro, dex ear drops  - Mupirocin ointment  - F/u culture Patient presented with 3 days of R ear pain, erythema, and discharge. Concern for mastoiditis vs Perichondritis vs chondritis of right pinna vs otitis externa. POCUS head and neck and in ED, no abscess noted  - ENT and ID consulted, recs appreciated  - MRI of head, no gadolinium - not needed  - C/w zosyn; vancomycin--dc'd 4/21 as per ID  - Cipro, dex ear drops  - Mupirocin ointment  - F/u culture

## 2024-04-21 NOTE — PROGRESS NOTE ADULT - SUBJECTIVE AND OBJECTIVE BOX
Josefina Meléndez PGY1  pager [phone] or check resident tab for coverage    Patient is a 38y old  Female who presents with a chief complaint of otitis externa with auricular & periauricular cellulitis (21 Apr 2024 09:36)      SUBJECTIVE / OVERNIGHT EVENTS:    MEDICATIONS  (STANDING):  aspirin  chewable 81 milliGRAM(s) Oral daily  ciprofloxacin  0.3% Ophthalmic Solution for Otic Use 5 Drop(s) Right Ear two times a day  dexAMETHasone 0.1% Ophthalmic Solution for OTIC Use 5 Drop(s) Right Ear two times a day  mupirocin 2% Ointment 1 Application(s) Topical two times a day  piperacillin/tazobactam IVPB.. 3.375 Gram(s) IV Intermittent every 8 hours  prenatal multivitamin 1 Tablet(s) Oral daily  vancomycin  IVPB 1000 milliGRAM(s) IV Intermittent every 12 hours    MEDICATIONS  (PRN):  acetaminophen     Tablet .. 650 milliGRAM(s) Oral every 6 hours PRN Temp greater or equal to 38C (100.4F), Mild Pain (1 - 3)      CAPILLARY BLOOD GLUCOSE        I&O's Summary      Vital Signs Last 24 Hrs  T(C): 36.6 (21 Apr 2024 05:30), Max: 36.6 (20 Apr 2024 12:34)  T(F): 97.9 (21 Apr 2024 05:30), Max: 97.9 (20 Apr 2024 12:34)  HR: 81 (21 Apr 2024 05:30) (75 - 81)  BP: 100/60 (21 Apr 2024 05:30) (97/51 - 102/60)  BP(mean): --  RR: 17 (21 Apr 2024 05:30) (17 - 18)  SpO2: 98% (21 Apr 2024 05:30) (98% - 100%)    Parameters below as of 21 Apr 2024 05:30  Patient On (Oxygen Delivery Method): room air        PHYSICAL EXAM:  GENERAL: no distress  PSYCH: A&O x3  HEAD: Atraumatic, Normocephalic  NECK: Supple, No JVD  CHEST/LUNG: clear to auscultation bilaterally  HEART: regular rate and rhythm, no murmurs  ABDOMEN: nontender to palpation, no rebound tenderness/guarding  EXTREMITIES: no edema on bilateral LE  NEUROLOGY: no focal neurologic deficit  SKIN: No rashes or lesions    LABS:                        10.7   8.06  )-----------( 225      ( 21 Apr 2024 06:30 )             32.0      04-21    136  |  102  |  7   ----------------------------<  76  3.6   |  20<L>  |  0.50    Ca    8.8      21 Apr 2024 06:30  Phos  4.0     04-21  Mg     1.90     04-21    TPro  7.5  /  Alb  4.0  /  TBili  0.5  /  DBili  x   /  AST  15  /  ALT  17  /  AlkPhos  115  04-19    PT/INR - ( 19 Apr 2024 12:25 )   PT: 11.6 sec;   INR: 1.04 ratio         PTT - ( 19 Apr 2024 12:25 )  PTT:29.9 sec      Urinalysis Basic - ( 21 Apr 2024 06:30 )    Color: x / Appearance: x / SG: x / pH: x  Gluc: 76 mg/dL / Ketone: x  / Bili: x / Urobili: x   Blood: x / Protein: x / Nitrite: x   Leuk Esterase: x / RBC: x / WBC x   Sq Epi: x / Non Sq Epi: x / Bacteria: x        RADIOLOGY & ADDITIONAL TESTS:    Imaging Personally Reviewed:    Consultant(s) Notes Reviewed:      Care Discussed with Consultants/Other Providers:   Josefina Meléndez PGY1  pager [phone] or check resident tab for coverage    Patient is a 38y old  Female who presents with a chief complaint of otitis externa with auricular & periauricular cellulitis (21 Apr 2024 09:36)      SUBJECTIVE / OVERNIGHT EVENTS: NAONE. Patient subjectively feels better. States reduced discharged and pain.     MEDICATIONS  (STANDING):  aspirin  chewable 81 milliGRAM(s) Oral daily  ciprofloxacin  0.3% Ophthalmic Solution for Otic Use 5 Drop(s) Right Ear two times a day  dexAMETHasone 0.1% Ophthalmic Solution for OTIC Use 5 Drop(s) Right Ear two times a day  mupirocin 2% Ointment 1 Application(s) Topical two times a day  piperacillin/tazobactam IVPB.. 3.375 Gram(s) IV Intermittent every 8 hours  prenatal multivitamin 1 Tablet(s) Oral daily  vancomycin  IVPB 1000 milliGRAM(s) IV Intermittent every 12 hours    MEDICATIONS  (PRN):  acetaminophen     Tablet .. 650 milliGRAM(s) Oral every 6 hours PRN Temp greater or equal to 38C (100.4F), Mild Pain (1 - 3)      CAPILLARY BLOOD GLUCOSE        I&O's Summary      Vital Signs Last 24 Hrs  T(C): 36.6 (21 Apr 2024 05:30), Max: 36.6 (20 Apr 2024 12:34)  T(F): 97.9 (21 Apr 2024 05:30), Max: 97.9 (20 Apr 2024 12:34)  HR: 81 (21 Apr 2024 05:30) (75 - 81)  BP: 100/60 (21 Apr 2024 05:30) (97/51 - 102/60)  BP(mean): --  RR: 17 (21 Apr 2024 05:30) (17 - 18)  SpO2: 98% (21 Apr 2024 05:30) (98% - 100%)    Parameters below as of 21 Apr 2024 05:30  Patient On (Oxygen Delivery Method): room air        PHYSICAL EXAM:  GENERAL: no distress  PSYCH: A&O x3  HEAD: Atraumatic, Normocephalic  NECK: Supple, No JVD  CHEST/LUNG: clear to auscultation bilaterally  HEART: regular rate and rhythm, no murmurs  ABDOMEN: nontender to palpation, no rebound tenderness/guarding  EXTREMITIES: no edema on bilateral LE  NEUROLOGY: no focal neurologic deficit  SKIN: No rashes or lesions    LABS:                        10.7   8.06  )-----------( 225      ( 21 Apr 2024 06:30 )             32.0      04-21    136  |  102  |  7   ----------------------------<  76  3.6   |  20<L>  |  0.50    Ca    8.8      21 Apr 2024 06:30  Phos  4.0     04-21  Mg     1.90     04-21    TPro  7.5  /  Alb  4.0  /  TBili  0.5  /  DBili  x   /  AST  15  /  ALT  17  /  AlkPhos  115  04-19    PT/INR - ( 19 Apr 2024 12:25 )   PT: 11.6 sec;   INR: 1.04 ratio         PTT - ( 19 Apr 2024 12:25 )  PTT:29.9 sec      Urinalysis Basic - ( 21 Apr 2024 06:30 )    Color: x / Appearance: x / SG: x / pH: x  Gluc: 76 mg/dL / Ketone: x  / Bili: x / Urobili: x   Blood: x / Protein: x / Nitrite: x   Leuk Esterase: x / RBC: x / WBC x   Sq Epi: x / Non Sq Epi: x / Bacteria: x        RADIOLOGY & ADDITIONAL TESTS:    Imaging Personally Reviewed:    Consultant(s) Notes Reviewed:      Care Discussed with Consultants/Other Providers:

## 2024-04-21 NOTE — PROGRESS NOTE ADULT - ASSESSMENT
Left message for call back PADMINI MORALES is a 38y Female with no significant past medical history presenting for right ear pain and swelling for the past 3 days. Concern for mastoiditis, initiated on zosyn and vancomycin.

## 2024-04-21 NOTE — PROGRESS NOTE ADULT - SUBJECTIVE AND OBJECTIVE BOX
Follow Up:  otitis externa    Interval History/ROS:  feels better  less pain preauricular       Allergies  No Known Allergies        ANTIMICROBIALS:  piperacillin/tazobactam IVPB.. 3.375 every 8 hours  vancomycin  IVPB 1000 every 8 hours      OTHER MEDS:  acetaminophen     Tablet .. 650 milliGRAM(s) Oral every 6 hours PRN  aspirin  chewable 81 milliGRAM(s) Oral daily  ciprofloxacin  0.3% Ophthalmic Solution for Otic Use 5 Drop(s) Right Ear two times a day  dexAMETHasone 0.1% Ophthalmic Solution for OTIC Use 5 Drop(s) Right Ear two times a day  mupirocin 2% Ointment 1 Application(s) Topical two times a day  prenatal multivitamin 1 Tablet(s) Oral daily      Vital Signs Last 24 Hrs  T(C): 36.6 (21 Apr 2024 05:30), Max: 36.6 (20 Apr 2024 12:34)  T(F): 97.9 (21 Apr 2024 05:30), Max: 97.9 (20 Apr 2024 12:34)  HR: 81 (21 Apr 2024 05:30) (75 - 81)  BP: 100/60 (21 Apr 2024 05:30) (97/51 - 102/60)  BP(mean): --  RR: 17 (21 Apr 2024 05:30) (17 - 18)  SpO2: 98% (21 Apr 2024 05:30) (98% - 100%)    Parameters below as of 21 Apr 2024 05:30  Patient On (Oxygen Delivery Method): room air        PHYSICAL EXAM:  Constitutional: Not in acute distress  Eyes: No icterus.  wick in right ear canal  swelling and tenderness preauricular and ear lobe  Neck: Supple  RS: Chest clear   CVS: S1, S2   Abdomen:gravid  Neuro: Alert, oriented to time/place/person  Cranial nerves 2-12 grossly normal. No focal abnormalities                          10.7   8.06  )-----------( 225      ( 21 Apr 2024 06:30 )             32.0       04-21    136  |  102  |  7   ----------------------------<  76  3.6   |  20<L>  |  0.50    Ca    8.8      21 Apr 2024 06:30  Phos  4.0     04-21  Mg     1.90     04-21    TPro  7.5  /  Alb  4.0  /  TBili  0.5  /  DBili  x   /  AST  15  /  ALT  17  /  AlkPhos  115  04-19      Urinalysis Basic - ( 21 Apr 2024 06:30 )    Color: x / Appearance: x / SG: x / pH: x  Gluc: 76 mg/dL / Ketone: x  / Bili: x / Urobili: x   Blood: x / Protein: x / Nitrite: x   Leuk Esterase: x / RBC: x / WBC x   Sq Epi: x / Non Sq Epi: x / Bacteria: x        MICROBIOLOGY:  Vancomycin Level, Trough: <4.0 ug/mL (04-21-24 @ 09:13)  v  Ear Ear  04-19-24   Rare Pseudomonas aeruginosa  Rare Klebsiella pneumoniae  Rare Staphylococcus aureus  --  --                RADIOLOGY:    < from: US Head + Neck Soft Tissue (04.19.24 @ 18:20) >    ACC: [id number] EXAM:  US NECK HEAD S&I   ORDERED BY: SILVANA HAIDER     PROCEDURE DATE:  04/19/2024          INTERPRETATION:  CLINICAL HISTORY: Swelling in the right periauricular   region, anterior to the right ear.    COMPARISON: NONE    TECHNIQUE: Focused grayscale sonographic imaging of the right   periauricular region was performed along with color doppler evaluation.    FINDINGS/  IMPRESSION:  Focused evaluation at the site of clinical concern reveals a normal lymph   node of 6 mm short axis. No fluid collection.            --- End of Report ---            SHANA ESCOBAR MD; Attending Radiologist    < end of copied text >

## 2024-04-21 NOTE — PROGRESS NOTE ADULT - PROBLEM SELECTOR PLAN 2
. 17 weeks pregnant. Medications reviewed with pharmacy.  - Avoid teratogens and radiation.  - C/w prenatal vitamins  - C/w ASA 81 for preeclampsia

## 2024-04-22 LAB
-  CLINDAMYCIN: SIGNIFICANT CHANGE UP
-  ERYTHROMYCIN: SIGNIFICANT CHANGE UP
-  GENTAMICIN: SIGNIFICANT CHANGE UP
-  OXACILLIN: SIGNIFICANT CHANGE UP
-  PENICILLIN: SIGNIFICANT CHANGE UP
-  RIFAMPIN: SIGNIFICANT CHANGE UP
-  TETRACYCLINE: SIGNIFICANT CHANGE UP
-  TRIMETHOPRIM/SULFAMETHOXAZOLE: SIGNIFICANT CHANGE UP
-  VANCOMYCIN: SIGNIFICANT CHANGE UP
ALBUMIN SERPL ELPH-MCNC: 3.4 G/DL — SIGNIFICANT CHANGE UP (ref 3.3–5)
ALP SERPL-CCNC: 139 U/L — HIGH (ref 40–120)
ALT FLD-CCNC: 25 U/L — SIGNIFICANT CHANGE UP (ref 4–33)
ANION GAP SERPL CALC-SCNC: 12 MMOL/L — SIGNIFICANT CHANGE UP (ref 7–14)
AST SERPL-CCNC: 21 U/L — SIGNIFICANT CHANGE UP (ref 4–32)
BASOPHILS # BLD AUTO: 0.02 K/UL — SIGNIFICANT CHANGE UP (ref 0–0.2)
BASOPHILS NFR BLD AUTO: 0.3 % — SIGNIFICANT CHANGE UP (ref 0–2)
BILIRUB SERPL-MCNC: 0.4 MG/DL — SIGNIFICANT CHANGE UP (ref 0.2–1.2)
BUN SERPL-MCNC: 9 MG/DL — SIGNIFICANT CHANGE UP (ref 7–23)
CALCIUM SERPL-MCNC: 8.8 MG/DL — SIGNIFICANT CHANGE UP (ref 8.4–10.5)
CHLORIDE SERPL-SCNC: 102 MMOL/L — SIGNIFICANT CHANGE UP (ref 98–107)
CO2 SERPL-SCNC: 23 MMOL/L — SIGNIFICANT CHANGE UP (ref 22–31)
CREAT SERPL-MCNC: 0.6 MG/DL — SIGNIFICANT CHANGE UP (ref 0.5–1.3)
EGFR: 116 ML/MIN/1.73M2 — SIGNIFICANT CHANGE UP
EOSINOPHIL # BLD AUTO: 0.1 K/UL — SIGNIFICANT CHANGE UP (ref 0–0.5)
EOSINOPHIL NFR BLD AUTO: 1.5 % — SIGNIFICANT CHANGE UP (ref 0–6)
GLUCOSE SERPL-MCNC: 76 MG/DL — SIGNIFICANT CHANGE UP (ref 70–99)
HCT VFR BLD CALC: 31.2 % — LOW (ref 34.5–45)
HGB BLD-MCNC: 10.7 G/DL — LOW (ref 11.5–15.5)
IANC: 4.62 K/UL — SIGNIFICANT CHANGE UP (ref 1.8–7.4)
IMM GRANULOCYTES NFR BLD AUTO: 0.6 % — SIGNIFICANT CHANGE UP (ref 0–0.9)
LYMPHOCYTES # BLD AUTO: 1.45 K/UL — SIGNIFICANT CHANGE UP (ref 1–3.3)
LYMPHOCYTES # BLD AUTO: 21.1 % — SIGNIFICANT CHANGE UP (ref 13–44)
MAGNESIUM SERPL-MCNC: 2.2 MG/DL — SIGNIFICANT CHANGE UP (ref 1.6–2.6)
MCHC RBC-ENTMCNC: 28 PG — SIGNIFICANT CHANGE UP (ref 27–34)
MCHC RBC-ENTMCNC: 34.3 GM/DL — SIGNIFICANT CHANGE UP (ref 32–36)
MCV RBC AUTO: 81.7 FL — SIGNIFICANT CHANGE UP (ref 80–100)
METHOD TYPE: SIGNIFICANT CHANGE UP
MONOCYTES # BLD AUTO: 0.63 K/UL — SIGNIFICANT CHANGE UP (ref 0–0.9)
MONOCYTES NFR BLD AUTO: 9.2 % — SIGNIFICANT CHANGE UP (ref 2–14)
NEUTROPHILS # BLD AUTO: 4.62 K/UL — SIGNIFICANT CHANGE UP (ref 1.8–7.4)
NEUTROPHILS NFR BLD AUTO: 67.3 % — SIGNIFICANT CHANGE UP (ref 43–77)
NRBC # BLD: 0 /100 WBCS — SIGNIFICANT CHANGE UP (ref 0–0)
NRBC # FLD: 0 K/UL — SIGNIFICANT CHANGE UP (ref 0–0)
PHOSPHATE SERPL-MCNC: 4.3 MG/DL — SIGNIFICANT CHANGE UP (ref 2.5–4.5)
PLATELET # BLD AUTO: 232 K/UL — SIGNIFICANT CHANGE UP (ref 150–400)
POTASSIUM SERPL-MCNC: 3.4 MMOL/L — LOW (ref 3.5–5.3)
POTASSIUM SERPL-SCNC: 3.4 MMOL/L — LOW (ref 3.5–5.3)
PROT SERPL-MCNC: 6.6 G/DL — SIGNIFICANT CHANGE UP (ref 6–8.3)
RBC # BLD: 3.82 M/UL — SIGNIFICANT CHANGE UP (ref 3.8–5.2)
RBC # FLD: 13.8 % — SIGNIFICANT CHANGE UP (ref 10.3–14.5)
SODIUM SERPL-SCNC: 137 MMOL/L — SIGNIFICANT CHANGE UP (ref 135–145)
WBC # BLD: 6.86 K/UL — SIGNIFICANT CHANGE UP (ref 3.8–10.5)
WBC # FLD AUTO: 6.86 K/UL — SIGNIFICANT CHANGE UP (ref 3.8–10.5)

## 2024-04-22 PROCEDURE — 99232 SBSQ HOSP IP/OBS MODERATE 35: CPT

## 2024-04-22 PROCEDURE — 99232 SBSQ HOSP IP/OBS MODERATE 35: CPT | Mod: GC

## 2024-04-22 RX ORDER — POTASSIUM CHLORIDE 20 MEQ
20 PACKET (EA) ORAL ONCE
Refills: 0 | Status: COMPLETED | OUTPATIENT
Start: 2024-04-22 | End: 2024-04-22

## 2024-04-22 RX ADMIN — MUPIROCIN 1 APPLICATION(S): 20 OINTMENT TOPICAL at 08:48

## 2024-04-22 RX ADMIN — PIPERACILLIN AND TAZOBACTAM 25 GRAM(S): 4; .5 INJECTION, POWDER, LYOPHILIZED, FOR SOLUTION INTRAVENOUS at 05:21

## 2024-04-22 RX ADMIN — Medication 20 MILLIEQUIVALENT(S): at 12:32

## 2024-04-22 RX ADMIN — PIPERACILLIN AND TAZOBACTAM 25 GRAM(S): 4; .5 INJECTION, POWDER, LYOPHILIZED, FOR SOLUTION INTRAVENOUS at 12:32

## 2024-04-22 RX ADMIN — Medication 81 MILLIGRAM(S): at 12:32

## 2024-04-22 RX ADMIN — Medication 1 TABLET(S): at 12:32

## 2024-04-22 RX ADMIN — PIPERACILLIN AND TAZOBACTAM 25 GRAM(S): 4; .5 INJECTION, POWDER, LYOPHILIZED, FOR SOLUTION INTRAVENOUS at 22:52

## 2024-04-22 RX ADMIN — MUPIROCIN 1 APPLICATION(S): 20 OINTMENT TOPICAL at 22:56

## 2024-04-22 RX ADMIN — Medication 5 DROP(S): at 22:56

## 2024-04-22 RX ADMIN — Medication 5 DROP(S): at 08:48

## 2024-04-22 NOTE — PROGRESS NOTE ADULT - SUBJECTIVE AND OBJECTIVE BOX
ENT Progress Note    Interval:   Pt seen and examined at bedside. Reports mild improvement in R otalgia since yesterday, has been steadily improving since initial eval. Wick remains in place. Receiving ciprodex drops, on zosyn.     Sensitivities show pansensitive except for isolated resistance to ampicillin    MEDICATIONS  (STANDING):  aspirin  chewable 81 milliGRAM(s) Oral daily  ciprofloxacin  0.3% Ophthalmic Solution for Otic Use 5 Drop(s) Right Ear two times a day  dexAMETHasone 0.1% Ophthalmic Solution for OTIC Use 5 Drop(s) Right Ear two times a day  mupirocin 2% Ointment 1 Application(s) Topical two times a day  piperacillin/tazobactam IVPB.. 3.375 Gram(s) IV Intermittent every 8 hours  prenatal multivitamin 1 Tablet(s) Oral daily  vancomycin  IVPB 1000 milliGRAM(s) IV Intermittent every 12 hours    MEDICATIONS  (PRN):  acetaminophen     Tablet .. 650 milliGRAM(s) Oral every 6 hours PRN Temp greater or equal to 38C (100.4F), Mild Pain (1 - 3)        Vital Signs Last 24 Hrs  T(C): 36.6 (21 Apr 2024 05:30), Max: 36.6 (20 Apr 2024 12:34)  T(F): 97.9 (21 Apr 2024 05:30), Max: 97.9 (20 Apr 2024 12:34)  HR: 81 (21 Apr 2024 05:30) (75 - 81)  BP: 100/60 (21 Apr 2024 05:30) (97/51 - 102/60)  BP(mean): --  RR: 17 (21 Apr 2024 05:30) (17 - 18)  SpO2: 98% (21 Apr 2024 05:30) (98% - 100%)    Parameters below as of 21 Apr 2024 05:30  Patient On (Oxygen Delivery Method): room air        Physical Exam:  Alert, NAD  Nonlabored Respirations RA, no stridor or stertor   Neck: soft, nt, full FOM   Right ear: minimal auricular edema, minimally ttp and manipulation, wick in place  Neuro: CN II-XII grossly intact                                 10.7   8.06  )-----------( 225      ( 21 Apr 2024 06:30 )             32.0    04-21    136  |  102  |  7   ----------------------------<  76  3.6   |  20<L>  |  0.50    Ca    8.8      21 Apr 2024 06:30  Phos  4.0     04-21  Mg     1.90     04-21    TPro  7.5  /  Alb  4.0  /  TBili  0.5  /  DBili  x   /  AST  15  /  ALT  17  /  AlkPhos  115  04-19   PT/INR - ( 19 Apr 2024 12:25 )   PT: 11.6 sec;   INR: 1.04 ratio         PTT - ( 19 Apr 2024 12:25 )  PTT:29.9 sec

## 2024-04-22 NOTE — PROGRESS NOTE ADULT - PROBLEM SELECTOR PLAN 1
Patient presented with 3 days of R ear pain, erythema, and discharge. Concern for mastoiditis vs Perichondritis vs chondritis of right pinna vs otitis externa. POCUS head and neck and in ED, no abscess noted  - ENT and ID consulted, recs appreciated  - MRI of head, no gadolinium - not needed  - C/w zosyn; vancomycin--dc'd 4/21 as per ID  - Cipro, dex ear drops  - Mupirocin ointment  - F/u culture - rare pseudomonas, klebsiella

## 2024-04-22 NOTE — PROGRESS NOTE ADULT - SUBJECTIVE AND OBJECTIVE BOX
CC: F/U for Mastoiditis    Saw/spoke to patient. No fevers, no chills. No new complaints.    Allergies  No Known Allergies    ANTIMICROBIALS:  piperacillin/tazobactam IVPB.. 3.375 every 8 hours    PE:    Vital Signs Last 24 Hrs  T(C): 36.6 (22 Apr 2024 05:20), Max: 36.6 (21 Apr 2024 20:55)  T(F): 97.8 (22 Apr 2024 05:20), Max: 97.8 (21 Apr 2024 20:55)  HR: 76 (22 Apr 2024 05:20) (76 - 94)  BP: 97/56 (22 Apr 2024 05:20) (97/56 - 109/62)  RR: 18 (22 Apr 2024 05:20) (18 - 18)  SpO2: 100% (22 Apr 2024 05:20) (97% - 100%)    Gen: AOx3, NAD, non-toxic  CV: Nontachycardic  Resp: Breathing comfortably, RA  Abd: Soft, nontender  IV/Skin: No thrombophlebitis    LABS:                        10.7   6.86  )-----------( 232      ( 22 Apr 2024 05:40 )             31.2     04-22    137  |  102  |  9   ----------------------------<  76  3.4<L>   |  23  |  0.60    Ca    8.8      22 Apr 2024 05:40  Phos  4.3     04-22  Mg     2.20     04-22    TPro  6.6  /  Alb  3.4  /  TBili  0.4  /  DBili  x   /  AST  21  /  ALT  25  /  AlkPhos  139<H>  04-22    Urinalysis Basic - ( 22 Apr 2024 05:40 )    Color: x / Appearance: x / SG: x / pH: x  Gluc: 76 mg/dL / Ketone: x  / Bili: x / Urobili: x   Blood: x / Protein: x / Nitrite: x   Leuk Esterase: x / RBC: x / WBC x   Sq Epi: x / Non Sq Epi: x / Bacteria: x    MICROBIOLOGY:    Ear Ear  04-19-24   Rare Pseudomonas aeruginosa  Rare Klebsiella pneumoniae  Rare Staphylococcus aureus  Rare Staphylococcus auricularis "Susceptibilities not performed"  Rare Streptococcus mitis/oralis group "Susceptibilities not performed"  --  Pseudomonas aeruginosa  Klebsiella pneumoniae    RADIOLOGY:    4/19 [facility]    FINDINGS/  IMPRESSION:  Focused evaluation at the site of clinical concern reveals a normal lymph   node of 6 mm short axis. No fluid collection.

## 2024-04-22 NOTE — PROGRESS NOTE ADULT - ASSESSMENT
PADMINI MORALES is a 38y Female with no significant past medical history presenting for right ear pain and swelling for the past 3 days. Concern for mastoiditis, no surgical intervention at this time, c/w IV abx.

## 2024-04-22 NOTE — PROGRESS NOTE ADULT - SUBJECTIVE AND OBJECTIVE BOX
Patient is a 38y old  Female who presents with a chief complaint of Mastoiditis    SUBJECTIVE / OVERNIGHT EVENTS: Patient seen and examined at bedside. No overnight events.    MEDICATIONS  (STANDING):  aspirin  chewable 81 milliGRAM(s) Oral daily  ciprofloxacin  0.3% Ophthalmic Solution for Otic Use 5 Drop(s) Right Ear two times a day  dexAMETHasone 0.1% Ophthalmic Solution for OTIC Use 5 Drop(s) Right Ear two times a day  mupirocin 2% Ointment 1 Application(s) Topical two times a day  piperacillin/tazobactam IVPB.. 3.375 Gram(s) IV Intermittent every 8 hours  prenatal multivitamin 1 Tablet(s) Oral daily    MEDICATIONS  (PRN):  acetaminophen     Tablet .. 650 milliGRAM(s) Oral every 6 hours PRN Temp greater or equal to 38C (100.4F), Mild Pain (1 - 3)    Vital Signs Last 24 Hrs  T(C): 36.6 (22 Apr 2024 05:20), Max: 36.6 (21 Apr 2024 13:20)  T(F): 97.8 (22 Apr 2024 05:20), Max: 97.9 (21 Apr 2024 13:20)  HR: 76 (22 Apr 2024 05:20) (71 - 94)  BP: 97/56 (22 Apr 2024 05:20) (97/56 - 111/62)  BP(mean): --  RR: 18 (22 Apr 2024 05:20) (18 - 18)  SpO2: 100% (22 Apr 2024 05:20) (97% - 100%)    Parameters below as of 22 Apr 2024 05:20  Patient On (Oxygen Delivery Method): room air    CAPILLARY BLOOD GLUCOSE    I&O's Summary    PHYSICAL EXAM:  GENERAL: NAD, Resting in bed  Eyes: EOMI, PERRLA, conjunctiva and sclera clear  HENT:  + Erythema, swelling from R ear in pre and post auricular area. Head atraumatic; Moist mucous membranes, normal oropharynx  NECK: Supple, No JVD, no lymphadenopathy, no thyroid nodules or enlargement  CHEST/LUNG: Clear to auscultation bilaterally; No rales, rhonchi, wheezing, or rubs. Unlabored respirations on room air  HEART: Regular rate and rhythm; No murmurs, rubs, or gallops  ABDOMEN: Bowel sounds present; Soft, Nontender, Nondistended.   EXTREMITIES:  2+ Peripheral Pulses, brisk capillary refill. No clubbing, cyanosis, or edema  NERVOUS SYSTEM:  Alert & Oriented X3, non-focal and spontaneous movements of all extremities  SKIN: No rashes or lesions  Psych: Normal behavior, normal affect, normal speech    LABS:                        10.7   8.06  )-----------( 225      ( 21 Apr 2024 06:30 )             32.0     04-21    136  |  102  |  7   ----------------------------<  76  3.6   |  20<L>  |  0.50    Ca    8.8      21 Apr 2024 06:30  Phos  4.0     04-21  Mg     1.90     04-21    Urinalysis Basic - ( 21 Apr 2024 06:30 )    Color: x / Appearance: x / SG: x / pH: x  Gluc: 76 mg/dL / Ketone: x  / Bili: x / Urobili: x   Blood: x / Protein: x / Nitrite: x   Leuk Esterase: x / RBC: x / WBC x   Sq Epi: x / Non Sq Epi: x / Bacteria: x    RADIOLOGY & ADDITIONAL TESTS:    Imaging Personally Reviewed:    Consultant(s) Notes Reviewed:      Care Discussed with Consultants/Other Providers:    Robby Hoffman MD, Internal Medicine Resident Patient is a 38y old  Female who presents with a chief complaint of Mastoiditis    SUBJECTIVE / OVERNIGHT EVENTS: Patient seen and examined at bedside. No overnight events. Patient with less pain this AM, states it is only confined to back of ear now and rates about 1-2 out of 10. Otherwise feels well this AM and without acute concerns. Denies CP, SOB, subjective fever, chills, n/v/d.    MEDICATIONS  (STANDING):  aspirin  chewable 81 milliGRAM(s) Oral daily  ciprofloxacin  0.3% Ophthalmic Solution for Otic Use 5 Drop(s) Right Ear two times a day  dexAMETHasone 0.1% Ophthalmic Solution for OTIC Use 5 Drop(s) Right Ear two times a day  mupirocin 2% Ointment 1 Application(s) Topical two times a day  piperacillin/tazobactam IVPB.. 3.375 Gram(s) IV Intermittent every 8 hours  prenatal multivitamin 1 Tablet(s) Oral daily    MEDICATIONS  (PRN):  acetaminophen     Tablet .. 650 milliGRAM(s) Oral every 6 hours PRN Temp greater or equal to 38C (100.4F), Mild Pain (1 - 3)    Vital Signs Last 24 Hrs  T(C): 36.6 (22 Apr 2024 05:20), Max: 36.6 (21 Apr 2024 13:20)  T(F): 97.8 (22 Apr 2024 05:20), Max: 97.9 (21 Apr 2024 13:20)  HR: 76 (22 Apr 2024 05:20) (71 - 94)  BP: 97/56 (22 Apr 2024 05:20) (97/56 - 111/62)  BP(mean): --  RR: 18 (22 Apr 2024 05:20) (18 - 18)  SpO2: 100% (22 Apr 2024 05:20) (97% - 100%)    Parameters below as of 22 Apr 2024 05:20  Patient On (Oxygen Delivery Method): room air    CAPILLARY BLOOD GLUCOSE    I&O's Summary    PHYSICAL EXAM:  GENERAL: NAD, Resting in bed  Eyes: EOMI, PERRLA, conjunctiva and sclera clear  HENT:  + Erythema, swelling from R ear in post auricular area. Head atraumatic; Moist mucous membranes, normal oropharynx  NECK: Supple, No JVD, no lymphadenopathy, no thyroid nodules or enlargement  CHEST/LUNG: Clear to auscultation bilaterally; No rales, rhonchi, wheezing, or rubs. Unlabored respirations on room air  HEART: Regular rate and rhythm; No murmurs, rubs, or gallops  ABDOMEN: Bowel sounds present; Soft, Nontender, Nondistended.   EXTREMITIES:  2+ Peripheral Pulses, brisk capillary refill. No clubbing, cyanosis, or edema  NERVOUS SYSTEM:  Alert & Oriented X3, non-focal and spontaneous movements of all extremities  SKIN: No rashes or lesions  Psych: Normal behavior, normal affect, normal speech    LABS:                        10.7   8.06  )-----------( 225      ( 21 Apr 2024 06:30 )             32.0     04-21    136  |  102  |  7   ----------------------------<  76  3.6   |  20<L>  |  0.50    Ca    8.8      21 Apr 2024 06:30  Phos  4.0     04-21  Mg     1.90     04-21    Urinalysis Basic - ( 21 Apr 2024 06:30 )    Color: x / Appearance: x / SG: x / pH: x  Gluc: 76 mg/dL / Ketone: x  / Bili: x / Urobili: x   Blood: x / Protein: x / Nitrite: x   Leuk Esterase: x / RBC: x / WBC x   Sq Epi: x / Non Sq Epi: x / Bacteria: x    RADIOLOGY & ADDITIONAL TESTS:    Imaging Personally Reviewed:    Consultant(s) Notes Reviewed:      Care Discussed with Consultants/Other Providers:    Robby Hoffman MD, Internal Medicine Resident

## 2024-04-22 NOTE — PROGRESS NOTE ADULT - ASSESSMENT
38F  17wkg pw R ear pain c/w severe OE with auricular/periauricular cellulitis. Right EAC with improving edema, wick replaced, unable to fully visualize TM. No fluctuance. US negative for collection.     - continue ciprodex drops  - continue systemic abx per ID  - cx sensitivities shows pansensitive to all but ampicillin   - dry ear precautions : do not get water in ear, when showering use a cotton ball with vaseline to keep ear dry   - ENT to follow

## 2024-04-22 NOTE — PROGRESS NOTE ADULT - ASSESSMENT
39 yo F  currently 17 weeks pregnant presenting for worsening R ear pain x2 days, associated with erythema, warmth, and tenderness in front of and behind the ear She also noticed thin yellow discharge from the ear, difficulty opening the mouth, and muffled hearing  No fevers/chills  No recent illnesses or sick contacts  ENT evaluated the patient and diagnosed patient with right chondritis, right otitis externa, and clinical mastoiditis.    Micro:  Ear Cx Klebsiella, pseudomonas, staph aureus (sensitivities testing)    Abx:  Zosyn  -->  Vanco -->    #Mastoiditis  #Preauricular and postauricular cellulitis  #R otitis externa    Clinically improving     nasal MRSA/ MSSA not detected    Recommendations:  - Continue  Zosyn 3.375g q8h for now  - follow final ear fluid Cx  - Anticipate will need prolonged antibiotic course ~4 week treatment with IV abx, ? Cefepime  - F/U ENT    Rey Quevedo MD  Contact on TEAMS messaging from 9am - 5pm  From 5pm-9am, on weekends, or if no response call [phone]

## 2024-04-23 LAB
ALBUMIN SERPL ELPH-MCNC: 3.4 G/DL — SIGNIFICANT CHANGE UP (ref 3.3–5)
ALP SERPL-CCNC: 146 U/L — HIGH (ref 40–120)
ALT FLD-CCNC: 24 U/L — SIGNIFICANT CHANGE UP (ref 4–33)
ANION GAP SERPL CALC-SCNC: 12 MMOL/L — SIGNIFICANT CHANGE UP (ref 7–14)
AST SERPL-CCNC: 17 U/L — SIGNIFICANT CHANGE UP (ref 4–32)
BASOPHILS # BLD AUTO: 0.02 K/UL — SIGNIFICANT CHANGE UP (ref 0–0.2)
BASOPHILS NFR BLD AUTO: 0.3 % — SIGNIFICANT CHANGE UP (ref 0–2)
BILIRUB SERPL-MCNC: 0.5 MG/DL — SIGNIFICANT CHANGE UP (ref 0.2–1.2)
BUN SERPL-MCNC: 10 MG/DL — SIGNIFICANT CHANGE UP (ref 7–23)
CALCIUM SERPL-MCNC: 8.8 MG/DL — SIGNIFICANT CHANGE UP (ref 8.4–10.5)
CHLORIDE SERPL-SCNC: 104 MMOL/L — SIGNIFICANT CHANGE UP (ref 98–107)
CO2 SERPL-SCNC: 21 MMOL/L — LOW (ref 22–31)
CREAT SERPL-MCNC: 0.64 MG/DL — SIGNIFICANT CHANGE UP (ref 0.5–1.3)
EGFR: 114 ML/MIN/1.73M2 — SIGNIFICANT CHANGE UP
EOSINOPHIL # BLD AUTO: 0.12 K/UL — SIGNIFICANT CHANGE UP (ref 0–0.5)
EOSINOPHIL NFR BLD AUTO: 1.8 % — SIGNIFICANT CHANGE UP (ref 0–6)
GLUCOSE SERPL-MCNC: 73 MG/DL — SIGNIFICANT CHANGE UP (ref 70–99)
HCT VFR BLD CALC: 31 % — LOW (ref 34.5–45)
HGB BLD-MCNC: 10.3 G/DL — LOW (ref 11.5–15.5)
IANC: 4.09 K/UL — SIGNIFICANT CHANGE UP (ref 1.8–7.4)
IMM GRANULOCYTES NFR BLD AUTO: 0.6 % — SIGNIFICANT CHANGE UP (ref 0–0.9)
LYMPHOCYTES # BLD AUTO: 1.79 K/UL — SIGNIFICANT CHANGE UP (ref 1–3.3)
LYMPHOCYTES # BLD AUTO: 27.4 % — SIGNIFICANT CHANGE UP (ref 13–44)
MAGNESIUM SERPL-MCNC: 2.2 MG/DL — SIGNIFICANT CHANGE UP (ref 1.6–2.6)
MCHC RBC-ENTMCNC: 27.8 PG — SIGNIFICANT CHANGE UP (ref 27–34)
MCHC RBC-ENTMCNC: 33.2 GM/DL — SIGNIFICANT CHANGE UP (ref 32–36)
MCV RBC AUTO: 83.6 FL — SIGNIFICANT CHANGE UP (ref 80–100)
MONOCYTES # BLD AUTO: 0.47 K/UL — SIGNIFICANT CHANGE UP (ref 0–0.9)
MONOCYTES NFR BLD AUTO: 7.2 % — SIGNIFICANT CHANGE UP (ref 2–14)
NEUTROPHILS # BLD AUTO: 4.09 K/UL — SIGNIFICANT CHANGE UP (ref 1.8–7.4)
NEUTROPHILS NFR BLD AUTO: 62.7 % — SIGNIFICANT CHANGE UP (ref 43–77)
NRBC # BLD: 0 /100 WBCS — SIGNIFICANT CHANGE UP (ref 0–0)
NRBC # FLD: 0 K/UL — SIGNIFICANT CHANGE UP (ref 0–0)
PHOSPHATE SERPL-MCNC: 3.5 MG/DL — SIGNIFICANT CHANGE UP (ref 2.5–4.5)
PLATELET # BLD AUTO: 229 K/UL — SIGNIFICANT CHANGE UP (ref 150–400)
POTASSIUM SERPL-MCNC: 3.7 MMOL/L — SIGNIFICANT CHANGE UP (ref 3.5–5.3)
POTASSIUM SERPL-SCNC: 3.7 MMOL/L — SIGNIFICANT CHANGE UP (ref 3.5–5.3)
PROT SERPL-MCNC: 6.5 G/DL — SIGNIFICANT CHANGE UP (ref 6–8.3)
RBC # BLD: 3.71 M/UL — LOW (ref 3.8–5.2)
RBC # FLD: 13.7 % — SIGNIFICANT CHANGE UP (ref 10.3–14.5)
SODIUM SERPL-SCNC: 137 MMOL/L — SIGNIFICANT CHANGE UP (ref 135–145)
WBC # BLD: 6.53 K/UL — SIGNIFICANT CHANGE UP (ref 3.8–10.5)
WBC # FLD AUTO: 6.53 K/UL — SIGNIFICANT CHANGE UP (ref 3.8–10.5)

## 2024-04-23 PROCEDURE — 99232 SBSQ HOSP IP/OBS MODERATE 35: CPT

## 2024-04-23 PROCEDURE — 99232 SBSQ HOSP IP/OBS MODERATE 35: CPT | Mod: GC

## 2024-04-23 RX ORDER — CEFEPIME 1 G/1
2 INJECTION, POWDER, FOR SOLUTION INTRAMUSCULAR; INTRAVENOUS
Qty: 108 | Refills: 0
Start: 2024-04-23 | End: 2024-05-28

## 2024-04-23 RX ADMIN — Medication 81 MILLIGRAM(S): at 12:06

## 2024-04-23 RX ADMIN — PIPERACILLIN AND TAZOBACTAM 25 GRAM(S): 4; .5 INJECTION, POWDER, LYOPHILIZED, FOR SOLUTION INTRAVENOUS at 21:29

## 2024-04-23 RX ADMIN — Medication 5 DROP(S): at 08:33

## 2024-04-23 RX ADMIN — MUPIROCIN 1 APPLICATION(S): 20 OINTMENT TOPICAL at 08:33

## 2024-04-23 RX ADMIN — PIPERACILLIN AND TAZOBACTAM 25 GRAM(S): 4; .5 INJECTION, POWDER, LYOPHILIZED, FOR SOLUTION INTRAVENOUS at 12:06

## 2024-04-23 RX ADMIN — PIPERACILLIN AND TAZOBACTAM 25 GRAM(S): 4; .5 INJECTION, POWDER, LYOPHILIZED, FOR SOLUTION INTRAVENOUS at 06:21

## 2024-04-23 RX ADMIN — Medication 5 DROP(S): at 21:31

## 2024-04-23 RX ADMIN — MUPIROCIN 1 APPLICATION(S): 20 OINTMENT TOPICAL at 21:33

## 2024-04-23 RX ADMIN — Medication 1 TABLET(S): at 12:06

## 2024-04-23 NOTE — PROGRESS NOTE ADULT - ASSESSMENT
37 yo F  currently 17 weeks pregnant presenting for worsening R ear pain x2 days, associated with erythema, warmth, and tenderness in front of and behind the ear She also noticed thin yellow discharge from the ear, difficulty opening the mouth, and muffled hearing  No fevers/chills  No recent illnesses or sick contacts  ENT evaluated the patient and diagnosed patient with right chondritis, right otitis externa, and clinical mastoiditis.    Micro:  Ear Cx Klebsiella, pseudomonas, staph aureus (sensitivities testing)    Abx:  Zosyn  -->  Vanco -->    #Mastoiditis  #Preauricular and postauricular cellulitis  #R otitis externa    Clinically improving     nasal MRSA/ MSSA not detected    Recommendations:  - Zosyn 3.375g q 8  - When discharge planning, send out on PICC line with Cefepime 2g q 8 (from start of Zosyn) to complete 6 weeks (will consider shortening course as outpatient to 4 weeks if response is optimal)  - Okay for PICC as long as no new signs infection/fevers/BCXs remain clear  - As outpatient, check CBC, BUN, Cr, LFTs weekly, fax to [phone], Dr. Quevedo  - Follow up with me in ID clinic in 6 weeks  - F/U ENT  - Cefepime/cephalosporins considered generally safe in pregnancy, discussed with patient    Rey Quevedo MD  Contact on TEAMS messaging from 9am - 5pm  From 5pm-9am, on weekends, or if no response call [phone]

## 2024-04-23 NOTE — PROGRESS NOTE ADULT - PROBLEM SELECTOR PLAN 1
Patient presented with 3 days of R ear pain, erythema, and discharge. Concern for mastoiditis vs Perichondritis vs chondritis of right pinna vs otitis externa. POCUS head and neck and in ED, no abscess noted  - ENT and ID consulted, recs appreciated  - MRI of head, no gadolinium - not needed  - C/w zosyn; vancomycin--dc'd 4/21 as per ID  - May need extended 4 week course, possible IV abx on discharge  - Cipro, dex ear drops  - Mupirocin ointment  - F/u culture - rare pseudomonas, klebsiella

## 2024-04-23 NOTE — PROGRESS NOTE ADULT - SUBJECTIVE AND OBJECTIVE BOX
CC: F/U for Mastoiditis    Saw/spoke to patient. No fevers, no chills. No new complaints.    Allergies  No Known Allergies    ANTIMICROBIALS:  piperacillin/tazobactam IVPB.. 3.375 every 8 hours    PE:    Vital Signs Last 24 Hrs  T(C): 36.7 (23 Apr 2024 12:42), Max: 36.7 (23 Apr 2024 12:42)  T(F): 98.1 (23 Apr 2024 12:42), Max: 98.1 (23 Apr 2024 12:42)  HR: 70 (23 Apr 2024 12:42) (70 - 78)  BP: 95/60 (23 Apr 2024 12:42) (95/60 - 103/60)  RR: 18 (23 Apr 2024 12:42) (17 - 18)  SpO2: 98% (23 Apr 2024 12:42) (98% - 99%)    Gen: AOx3, NAD, non-toxic  CV: Nontachycardic  Resp: Breathing comfortably, RA  Abd: Soft, nontender  IV/Skin: No thrombophlebitis    LABS:                        10.3   6.53  )-----------( 229      ( 23 Apr 2024 06:10 )             31.0     04-23    137  |  104  |  10  ----------------------------<  73  3.7   |  21<L>  |  0.64    Ca    8.8      23 Apr 2024 06:10  Phos  3.5     04-23  Mg     2.20     04-23    TPro  6.5  /  Alb  3.4  /  TBili  0.5  /  DBili  x   /  AST  17  /  ALT  24  /  AlkPhos  146<H>  04-23    Urinalysis Basic - ( 23 Apr 2024 06:10 )    Color: x / Appearance: x / SG: x / pH: x  Gluc: 73 mg/dL / Ketone: x  / Bili: x / Urobili: x   Blood: x / Protein: x / Nitrite: x   Leuk Esterase: x / RBC: x / WBC x   Sq Epi: x / Non Sq Epi: x / Bacteria: x    MICROBIOLOGY:    Ear Ear  04-19-24   Rare Pseudomonas aeruginosa  Rare Klebsiella pneumoniae  Rare Staphylococcus aureus  Rare Staphylococcus auricularis "Susceptibilities not performed"  Rare Streptococcus mitis/oralis group "Susceptibilities not performed"  --  Pseudomonas aeruginosa  Klebsiella pneumoniae  Staphylococcus aureus    RADIOLOGY:    4/19      FINDINGS/  IMPRESSION:  Focused evaluation at the site of clinical concern reveals a normal lymph   node of 6 mm short axis. No fluid collection.

## 2024-04-23 NOTE — PROGRESS NOTE ADULT - SUBJECTIVE AND OBJECTIVE BOX
Patient is a 38y old  Female who presents with a chief complaint of Mastoiditis    SUBJECTIVE / OVERNIGHT EVENTS: Patient seen and examined at bedside. No overnight events.    MEDICATIONS  (STANDING):  aspirin  chewable 81 milliGRAM(s) Oral daily  ciprofloxacin  0.3% Ophthalmic Solution for Otic Use 5 Drop(s) Right Ear two times a day  dexAMETHasone 0.1% Ophthalmic Solution for OTIC Use 5 Drop(s) Right Ear two times a day  mupirocin 2% Ointment 1 Application(s) Topical two times a day  piperacillin/tazobactam IVPB.. 3.375 Gram(s) IV Intermittent every 8 hours  prenatal multivitamin 1 Tablet(s) Oral daily    MEDICATIONS  (PRN):  acetaminophen     Tablet .. 650 milliGRAM(s) Oral every 6 hours PRN Temp greater or equal to 38C (100.4F), Mild Pain (1 - 3)    Vital Signs Last 24 Hrs  T(C): 36.5 (23 Apr 2024 05:49), Max: 36.7 (22 Apr 2024 14:57)  T(F): 97.7 (23 Apr 2024 05:49), Max: 98 (22 Apr 2024 14:57)  HR: 70 (23 Apr 2024 05:49) (70 - 78)  BP: 103/60 (23 Apr 2024 05:49) (102/54 - 103/60)  BP(mean): --  RR: 17 (23 Apr 2024 05:49) (17 - 17)  SpO2: 98% (23 Apr 2024 05:49) (98% - 99%)    Parameters below as of 23 Apr 2024 05:49  Patient On (Oxygen Delivery Method): room air    CAPILLARY BLOOD GLUCOSE    I&O's Summary    PHYSICAL EXAM:  GENERAL: NAD, Resting in bed  Eyes: EOMI, PERRLA, conjunctiva and sclera clear  HENT:  + Erythema, swelling from R ear in post auricular area. Head atraumatic; Moist mucous membranes, normal oropharynx  NECK: Supple, No JVD, no lymphadenopathy, no thyroid nodules or enlargement  CHEST/LUNG: Clear to auscultation bilaterally; No rales, rhonchi, wheezing, or rubs. Unlabored respirations on room air  HEART: Regular rate and rhythm; No murmurs, rubs, or gallops  ABDOMEN: Bowel sounds present; Soft, Nontender, Nondistended.   EXTREMITIES:  2+ Peripheral Pulses, brisk capillary refill. No clubbing, cyanosis, or edema  NERVOUS SYSTEM:  Alert & Oriented X3, non-focal and spontaneous movements of all extremities  SKIN: No rashes or lesions  Psych: Normal behavior, normal affect, normal speech    LABS:                        10.7   6.86  )-----------( 232      ( 22 Apr 2024 05:40 )             31.2     04-22    137  |  102  |  9   ----------------------------<  76  3.4<L>   |  23  |  0.60    Ca    8.8      22 Apr 2024 05:40  Phos  4.3     04-22  Mg     2.20     04-22    TPro  6.6  /  Alb  3.4  /  TBili  0.4  /  DBili  x   /  AST  21  /  ALT  25  /  AlkPhos  139<H>  04-22    Urinalysis Basic - ( 22 Apr 2024 05:40 )    Color: x / Appearance: x / SG: x / pH: x  Gluc: 76 mg/dL / Ketone: x  / Bili: x / Urobili: x   Blood: x / Protein: x / Nitrite: x   Leuk Esterase: x / RBC: x / WBC x   Sq Epi: x / Non Sq Epi: x / Bacteria: x    RADIOLOGY & ADDITIONAL TESTS:    Imaging Personally Reviewed:    Consultant(s) Notes Reviewed:      Care Discussed with Consultants/Other Providers:    Robby Hoffman MD, Internal Medicine Resident Patient is a 38y old  Female who presents with a chief complaint of Mastoiditis    SUBJECTIVE / OVERNIGHT EVENTS: Patient seen and examined at bedside. No overnight events. Patient reporting resolution of auricular pain and discharge. Feels well overall but is understanding of the need to stay inpatient. Denies CP, SOB, subjective fever, chills, n/v/d.    MEDICATIONS  (STANDING):  aspirin  chewable 81 milliGRAM(s) Oral daily  ciprofloxacin  0.3% Ophthalmic Solution for Otic Use 5 Drop(s) Right Ear two times a day  dexAMETHasone 0.1% Ophthalmic Solution for OTIC Use 5 Drop(s) Right Ear two times a day  mupirocin 2% Ointment 1 Application(s) Topical two times a day  piperacillin/tazobactam IVPB.. 3.375 Gram(s) IV Intermittent every 8 hours  prenatal multivitamin 1 Tablet(s) Oral daily    MEDICATIONS  (PRN):  acetaminophen     Tablet .. 650 milliGRAM(s) Oral every 6 hours PRN Temp greater or equal to 38C (100.4F), Mild Pain (1 - 3)    Vital Signs Last 24 Hrs  T(C): 36.5 (23 Apr 2024 05:49), Max: 36.7 (22 Apr 2024 14:57)  T(F): 97.7 (23 Apr 2024 05:49), Max: 98 (22 Apr 2024 14:57)  HR: 70 (23 Apr 2024 05:49) (70 - 78)  BP: 103/60 (23 Apr 2024 05:49) (102/54 - 103/60)  BP(mean): --  RR: 17 (23 Apr 2024 05:49) (17 - 17)  SpO2: 98% (23 Apr 2024 05:49) (98% - 99%)    Parameters below as of 23 Apr 2024 05:49  Patient On (Oxygen Delivery Method): room air    CAPILLARY BLOOD GLUCOSE    I&O's Summary    PHYSICAL EXAM:  GENERAL: NAD, Resting in bed  Eyes: EOMI, PERRLA, conjunctiva and sclera clear  HENT:  + Erythema, swelling from R ear in post auricular area. Head atraumatic; Moist mucous membranes, normal oropharynx  NECK: Supple, No JVD, no lymphadenopathy, no thyroid nodules or enlargement  CHEST/LUNG: Clear to auscultation bilaterally; No rales, rhonchi, wheezing, or rubs. Unlabored respirations on room air  HEART: Regular rate and rhythm; No murmurs, rubs, or gallops  ABDOMEN: Bowel sounds present; Soft, Nontender, Nondistended.   EXTREMITIES:  2+ Peripheral Pulses, brisk capillary refill. No clubbing, cyanosis, or edema  NERVOUS SYSTEM:  Alert & Oriented X3, non-focal and spontaneous movements of all extremities  SKIN: No rashes or lesions  Psych: Normal behavior, normal affect, normal speech    LABS:                        10.7   6.86  )-----------( 232      ( 22 Apr 2024 05:40 )             31.2     04-22    137  |  102  |  9   ----------------------------<  76  3.4<L>   |  23  |  0.60    Ca    8.8      22 Apr 2024 05:40  Phos  4.3     04-22  Mg     2.20     04-22    TPro  6.6  /  Alb  3.4  /  TBili  0.4  /  DBili  x   /  AST  21  /  ALT  25  /  AlkPhos  139<H>  04-22    Urinalysis Basic - ( 22 Apr 2024 05:40 )    Color: x / Appearance: x / SG: x / pH: x  Gluc: 76 mg/dL / Ketone: x  / Bili: x / Urobili: x   Blood: x / Protein: x / Nitrite: x   Leuk Esterase: x / RBC: x / WBC x   Sq Epi: x / Non Sq Epi: x / Bacteria: x    RADIOLOGY & ADDITIONAL TESTS:    Imaging Personally Reviewed:    Consultant(s) Notes Reviewed:      Care Discussed with Consultants/Other Providers:    Robby Hoffman MD, Internal Medicine Resident

## 2024-04-23 NOTE — PROGRESS NOTE ADULT - ASSESSMENT
38F  17wkg pw R ear pain c/w severe AOE, improving exam with persistent mild EAC edema, unable to visualize TM. Wick replaced  - continue ciprodex drops  - continue abx and mupirocin per ID  - cx sensitivities shows pansensitive to all but ampicillin   - dry ear precautions : do not get water in ear, when showering use a cotton ball with vaseline to keep ear dry   - ENT to follow for daily debridement and wick management

## 2024-04-23 NOTE — PROGRESS NOTE ADULT - SUBJECTIVE AND OBJECTIVE BOX
ENT Progress Note    Interval:   4/22: Pt seen and examined at bedside. Reports mild improvement in R otalgia since yesterday, has been steadily improving since initial eval. Wick remains in place. Receiving ciprodex drops, on zosyn. Cx sensitivities show pansensitive except for isolated resistance to ampicillin  4/23: Improvement in tenderness, erythema, and edema. No tenderness to palpation of pinna. EAC remains mildly edematous, unable to fully visualize TM. Wick replaced with drops. Remains afebrile, no leukocytosis. Remains on zosyn, ciprodex gtt, and mupirocin        MEDICATIONS  (STANDING):  aspirin  chewable 81 milliGRAM(s) Oral daily  ciprofloxacin  0.3% Ophthalmic Solution for Otic Use 5 Drop(s) Right Ear two times a day  dexAMETHasone 0.1% Ophthalmic Solution for OTIC Use 5 Drop(s) Right Ear two times a day  mupirocin 2% Ointment 1 Application(s) Topical two times a day  piperacillin/tazobactam IVPB.. 3.375 Gram(s) IV Intermittent every 8 hours  prenatal multivitamin 1 Tablet(s) Oral daily  vancomycin  IVPB 1000 milliGRAM(s) IV Intermittent every 12 hours    MEDICATIONS  (PRN):  acetaminophen     Tablet .. 650 milliGRAM(s) Oral every 6 hours PRN Temp greater or equal to 38C (100.4F), Mild Pain (1 - 3)      ICU Vital Signs Last 24 Hrs  T(C): 36.5 (23 Apr 2024 05:49), Max: 36.7 (22 Apr 2024 14:57)  T(F): 97.7 (23 Apr 2024 05:49), Max: 98 (22 Apr 2024 14:57)  HR: 70 (23 Apr 2024 05:49) (70 - 78)  BP: 103/60 (23 Apr 2024 05:49) (102/54 - 103/60)  BP(mean): --  ABP: --  ABP(mean): --  RR: 17 (23 Apr 2024 05:49) (17 - 17)  SpO2: 98% (23 Apr 2024 05:49) (98% - 99%)    O2 Parameters below as of 23 Apr 2024 05:49  Patient On (Oxygen Delivery Method): room air              Physical Exam:  Alert, NAD  Nonlabored Respirations RA, no stridor or stertor   Neck: soft, nt, full FOM   Right ear: minimal auricular erythema, scant edema, no TTP, EAC mildly edematous, unable to view full TM, wick replaced  Neuro: CN II-XII grossly intact                           10.3   6.53  )-----------( 229      ( 23 Apr 2024 06:10 )             31.0

## 2024-04-24 ENCOUNTER — TRANSCRIPTION ENCOUNTER (OUTPATIENT)
Age: 39
End: 2024-04-24

## 2024-04-24 LAB
ALBUMIN SERPL ELPH-MCNC: 3.2 G/DL — LOW (ref 3.3–5)
ALP SERPL-CCNC: 145 U/L — HIGH (ref 40–120)
ALT FLD-CCNC: 21 U/L — SIGNIFICANT CHANGE UP (ref 4–33)
ANION GAP SERPL CALC-SCNC: 13 MMOL/L — SIGNIFICANT CHANGE UP (ref 7–14)
AST SERPL-CCNC: 16 U/L — SIGNIFICANT CHANGE UP (ref 4–32)
BASOPHILS # BLD AUTO: 0.02 K/UL — SIGNIFICANT CHANGE UP (ref 0–0.2)
BASOPHILS NFR BLD AUTO: 0.3 % — SIGNIFICANT CHANGE UP (ref 0–2)
BILIRUB SERPL-MCNC: 0.4 MG/DL — SIGNIFICANT CHANGE UP (ref 0.2–1.2)
BUN SERPL-MCNC: 10 MG/DL — SIGNIFICANT CHANGE UP (ref 7–23)
CALCIUM SERPL-MCNC: 8.4 MG/DL — SIGNIFICANT CHANGE UP (ref 8.4–10.5)
CHLORIDE SERPL-SCNC: 103 MMOL/L — SIGNIFICANT CHANGE UP (ref 98–107)
CO2 SERPL-SCNC: 20 MMOL/L — LOW (ref 22–31)
CREAT SERPL-MCNC: 0.59 MG/DL — SIGNIFICANT CHANGE UP (ref 0.5–1.3)
CULTURE RESULTS: ABNORMAL
EGFR: 117 ML/MIN/1.73M2 — SIGNIFICANT CHANGE UP
EOSINOPHIL # BLD AUTO: 0.12 K/UL — SIGNIFICANT CHANGE UP (ref 0–0.5)
EOSINOPHIL NFR BLD AUTO: 1.9 % — SIGNIFICANT CHANGE UP (ref 0–6)
GLUCOSE SERPL-MCNC: 74 MG/DL — SIGNIFICANT CHANGE UP (ref 70–99)
HCT VFR BLD CALC: 31.2 % — LOW (ref 34.5–45)
HGB BLD-MCNC: 10.2 G/DL — LOW (ref 11.5–15.5)
IANC: 4.18 K/UL — SIGNIFICANT CHANGE UP (ref 1.8–7.4)
IMM GRANULOCYTES NFR BLD AUTO: 0.5 % — SIGNIFICANT CHANGE UP (ref 0–0.9)
LYMPHOCYTES # BLD AUTO: 1.68 K/UL — SIGNIFICANT CHANGE UP (ref 1–3.3)
LYMPHOCYTES # BLD AUTO: 26 % — SIGNIFICANT CHANGE UP (ref 13–44)
MAGNESIUM SERPL-MCNC: 2.1 MG/DL — SIGNIFICANT CHANGE UP (ref 1.6–2.6)
MCHC RBC-ENTMCNC: 27.1 PG — SIGNIFICANT CHANGE UP (ref 27–34)
MCHC RBC-ENTMCNC: 32.7 GM/DL — SIGNIFICANT CHANGE UP (ref 32–36)
MCV RBC AUTO: 83 FL — SIGNIFICANT CHANGE UP (ref 80–100)
MONOCYTES # BLD AUTO: 0.43 K/UL — SIGNIFICANT CHANGE UP (ref 0–0.9)
MONOCYTES NFR BLD AUTO: 6.7 % — SIGNIFICANT CHANGE UP (ref 2–14)
NEUTROPHILS # BLD AUTO: 4.18 K/UL — SIGNIFICANT CHANGE UP (ref 1.8–7.4)
NEUTROPHILS NFR BLD AUTO: 64.6 % — SIGNIFICANT CHANGE UP (ref 43–77)
NRBC # BLD: 0 /100 WBCS — SIGNIFICANT CHANGE UP (ref 0–0)
NRBC # FLD: 0 K/UL — SIGNIFICANT CHANGE UP (ref 0–0)
ORGANISM # SPEC MICROSCOPIC CNT: ABNORMAL
PHOSPHATE SERPL-MCNC: 3.2 MG/DL — SIGNIFICANT CHANGE UP (ref 2.5–4.5)
PLATELET # BLD AUTO: 231 K/UL — SIGNIFICANT CHANGE UP (ref 150–400)
POTASSIUM SERPL-MCNC: 3.7 MMOL/L — SIGNIFICANT CHANGE UP (ref 3.5–5.3)
POTASSIUM SERPL-SCNC: 3.7 MMOL/L — SIGNIFICANT CHANGE UP (ref 3.5–5.3)
PROT SERPL-MCNC: 6.5 G/DL — SIGNIFICANT CHANGE UP (ref 6–8.3)
RBC # BLD: 3.76 M/UL — LOW (ref 3.8–5.2)
RBC # FLD: 13.6 % — SIGNIFICANT CHANGE UP (ref 10.3–14.5)
SODIUM SERPL-SCNC: 136 MMOL/L — SIGNIFICANT CHANGE UP (ref 135–145)
SPECIMEN SOURCE: SIGNIFICANT CHANGE UP
WBC # BLD: 6.46 K/UL — SIGNIFICANT CHANGE UP (ref 3.8–10.5)
WBC # FLD AUTO: 6.46 K/UL — SIGNIFICANT CHANGE UP (ref 3.8–10.5)

## 2024-04-24 PROCEDURE — 99232 SBSQ HOSP IP/OBS MODERATE 35: CPT

## 2024-04-24 PROCEDURE — 99232 SBSQ HOSP IP/OBS MODERATE 35: CPT | Mod: GC

## 2024-04-24 RX ORDER — CEFEPIME 1 G/1
2 INJECTION, POWDER, FOR SOLUTION INTRAMUSCULAR; INTRAVENOUS
Qty: 24 | Refills: 0
Start: 2024-04-24 | End: 2024-05-01

## 2024-04-24 RX ADMIN — PIPERACILLIN AND TAZOBACTAM 25 GRAM(S): 4; .5 INJECTION, POWDER, LYOPHILIZED, FOR SOLUTION INTRAVENOUS at 05:42

## 2024-04-24 RX ADMIN — MUPIROCIN 1 APPLICATION(S): 20 OINTMENT TOPICAL at 08:23

## 2024-04-24 RX ADMIN — Medication 81 MILLIGRAM(S): at 12:48

## 2024-04-24 RX ADMIN — PIPERACILLIN AND TAZOBACTAM 25 GRAM(S): 4; .5 INJECTION, POWDER, LYOPHILIZED, FOR SOLUTION INTRAVENOUS at 20:07

## 2024-04-24 RX ADMIN — Medication 1 TABLET(S): at 12:48

## 2024-04-24 RX ADMIN — Medication 5 DROP(S): at 08:23

## 2024-04-24 RX ADMIN — Medication 5 DROP(S): at 20:09

## 2024-04-24 RX ADMIN — PIPERACILLIN AND TAZOBACTAM 25 GRAM(S): 4; .5 INJECTION, POWDER, LYOPHILIZED, FOR SOLUTION INTRAVENOUS at 12:48

## 2024-04-24 RX ADMIN — MUPIROCIN 1 APPLICATION(S): 20 OINTMENT TOPICAL at 20:09

## 2024-04-24 NOTE — PROGRESS NOTE ADULT - SUBJECTIVE AND OBJECTIVE BOX
CC: F/U for OE    Saw/spoke to patient. No fevers, no chills. No new complaints.    Allergies  No Known Allergies    ANTIMICROBIALS:  piperacillin/tazobactam IVPB.. 3.375 every 8 hours    PE:    Vital Signs Last 24 Hrs  T(C): 36.5 (24 Apr 2024 12:35), Max: 36.7 (23 Apr 2024 22:27)  T(F): 97.7 (24 Apr 2024 12:35), Max: 98.1 (24 Apr 2024 05:07)  HR: 72 (24 Apr 2024 12:35) (72 - 79)  BP: 101/60 (24 Apr 2024 12:35) (98/50 - 101/60)  RR: 17 (24 Apr 2024 12:35) (17 - 18)  SpO2: 100% (24 Apr 2024 12:35) (97% - 100%)    Gen: AOx3, NAD, non-toxic  CV: Nontachycardic  Resp: Breathing comfortably, RA  Abd: Soft, nontender  IV/Skin: No thrombophlebitis    LABS:                        10.2   6.46  )-----------( 231      ( 24 Apr 2024 06:20 )             31.2     04-24    136  |  103  |  10  ----------------------------<  74  3.7   |  20<L>  |  0.59    Ca    8.4      24 Apr 2024 06:20  Phos  3.2     04-24  Mg     2.10     04-24    TPro  6.5  /  Alb  3.2<L>  /  TBili  0.4  /  DBili  x   /  AST  16  /  ALT  21  /  AlkPhos  145<H>  04-24    Urinalysis Basic - ( 24 Apr 2024 06:20 )    Color: x / Appearance: x / SG: x / pH: x  Gluc: 74 mg/dL / Ketone: x  / Bili: x / Urobili: x   Blood: x / Protein: x / Nitrite: x   Leuk Esterase: x / RBC: x / WBC x   Sq Epi: x / Non Sq Epi: x / Bacteria: x    MICROBIOLOGY:    Ear Ear  04-19-24   Rare Pseudomonas aeruginosa  Rare Klebsiella pneumoniae  Rare Staphylococcus aureus  Rare Staphylococcus auricularis "Susceptibilities not performed"  Rare Streptococcus mitis/oralis group "Susceptibilities not performed"  --  Pseudomonas aeruginosa  Klebsiella pneumoniae  Staphylococcus aureus    RADIOLOGY:    4/19 USG    FINDINGS/  IMPRESSION:  Focused evaluation at the site of clinical concern reveals a normal lymph   node of 6 mm short axis. No fluid collection.

## 2024-04-24 NOTE — DISCHARGE NOTE NURSING/CASE MANAGEMENT/SOCIAL WORK - NSDCPEFALRISK_GEN_ALL_CORE
For information on Fall & Injury Prevention, visit: [email or web address] OR  [email or web address] OR  https://www.cdc.gov/steadi/patient.html

## 2024-04-24 NOTE — PROGRESS NOTE ADULT - PROBLEM SELECTOR PLAN 1
Patient presented with 3 days of R ear pain, erythema, and discharge. Concern for mastoiditis vs Perichondritis vs chondritis of right pinna vs otitis externa. POCUS head and neck and in ED, no abscess noted  - ENT and ID consulted, recs appreciated  - MRI of head, no gadolinium - not needed  - C/w zosyn; vancomycin--dc'd 4/21 as per ID  - May need extended 4 week course, possible IV abx on discharge  - Cipro, dex ear drops  - Mupirocin ointment  - F/u culture - rare pseudomonas, klebsiella Patient presented with 3 days of R ear pain, erythema, and discharge. Concern for mastoiditis vs Perichondritis vs chondritis of right pinna vs otitis externa. POCUS head and neck and in ED, no abscess noted  - ENT and ID consulted, recs appreciated  - MRI of head, no gadolinium - not needed  - C/w zosyn; vancomycin--dc'd 4/21 as per ID  - May need extended 4 week course, possible IV abx on discharge  - Cipro, dex ear drops for 7 days total  - Mupirocin ointment  - F/u culture - rare pseudomonas, klebsiella  - Discharge on cefepime 2g q8h for 2 weeks total (4/19 - 5/2)  - Midline for IV abx access prior to discharge

## 2024-04-24 NOTE — DISCHARGE NOTE NURSING/CASE MANAGEMENT/SOCIAL WORK - NSDPLANG ASIS_GEN_ALL_CORE
8/2/2021 at 11:38 rescheduled with patient as follows:     Future Appointments   Date Time Provider Dre Tabitha   10/7/2021  1:40 PM Diann Sewell NP [facility] AMB   12/7/2021  9:30 AM Sandra Lopez MD [facility] AMB
Patient returning a call to the  in regards to cancelled appointment on 10/06/21            [phone]
No

## 2024-04-24 NOTE — PROGRESS NOTE ADULT - ASSESSMENT
37 yo F  currently 17 weeks pregnant presenting for worsening R ear pain x2 days, associated with erythema, warmth, and tenderness in front of and behind the ear She also noticed thin yellow discharge from the ear, difficulty opening the mouth, and muffled hearing  No fevers/chills  No recent illnesses or sick contacts  ENT evaluated the patient and diagnosed patient with right chondritis, right otitis externa  Note ENT note with low suspicion for clinical mastoiditis documented on     Micro:  Ear Cx Klebsiella, pseudomonas, staph aureus (sensitivities testing)    #Preauricular and postauricular cellulitis  #R otitis externa    Recommendations:  - Zosyn 3.375g q 8  - When discharge planning, send out on midline with Cefepime 2g q 12 (from start of Zosyn) to complete 2 weeks including Zosyn days  - Okay for midline as long as no new signs infection/fevers/BCXs remain clear  - As outpatient, check CBC, BUN, Cr, LFTs weekly, fax to [phone], Dr. Quevedo  - Follow up with me in ID clinic in 6 weeks  - F/U ENT  - Cefepime/cephalosporins considered generally safe in pregnancy, discussed with patient    Rey Quevedo MD  Contact on TEAMS messaging from 9am - 5pm  From 5pm-9am, on weekends, or if no response call [phone]

## 2024-04-24 NOTE — DISCHARGE NOTE NURSING/CASE MANAGEMENT/SOCIAL WORK - PATIENT PORTAL LINK FT
You can access the FollowMyHealth Patient Portal offered by [facility] by registering at the following website: [email or web address]. By joining [facility]’s FollowMyHealth portal, you will also be able to view your health information using other applications (apps) compatible with our system. Calm

## 2024-04-24 NOTE — ADVANCED PRACTICE NURSE CONSULT - ASSESSMENT
Patient is aware and alert. Midline insertion along with risks, benefits, possible complications and infection prevention explained to patient who verbalized understanding. All questions addressed and patient gave verbal consent to place midline. Left arm cleansed with CHG. Using sterile technique under ultra sound guidance, placed PowerGlide Pro Midline 20G /10cm into Left Cephalic vein. Brisk blood return and flushed with 20Mls of normal saline. Minimal blood loss and patient tolerated procedure well. CHG dressing placed. All sharps accounted for. Report given to district RN and ordering provider. LOT#: [id number] , REF#: [id number]

## 2024-04-24 NOTE — PROGRESS NOTE ADULT - ASSESSMENT
38F  17wkg pw R ear pain c/w severe AOE, improving exam with persistent mild EAC edema, unable to visualize TM. Wick replaced on .    - continue ciprodex gtt  - continue abx (on Zosyn) and mupirocin per ID  - cx sensitivities shows pansensitive to all but ampicillin   - dry ear precautions : do not get water in ear, when showering use a cotton ball with Vaseline to keep ear dry   - ENT to follow for daily debridement and wick management - please notify ENT if discharging patient  38F  17wkg pw R ear pain c/w severe AOE, improving exam with persistent mild EAC edema, partial view of TM obtained. Wick replaced on . Low concern for clinically significant mastoiditis.    - continue ciprodex gtt  - continue abx (on Zosyn) and mupirocin per ID  - cx sensitivities shows pansensitive to all but ampicillin   - dry ear precautions : do not get water in ear, when showering use a cotton ball with Vaseline to keep ear dry   - ENT to follow for daily debridement and wick management - please notify ENT if discharging patient

## 2024-04-24 NOTE — PROGRESS NOTE ADULT - SUBJECTIVE AND OBJECTIVE BOX
Patient is a 38y old  Female who presents with a chief complaint of Mastoiditis    SUBJECTIVE / OVERNIGHT EVENTS: Patient seen and examined at bedside. No overnight events.    MEDICATIONS  (STANDING):  aspirin  chewable 81 milliGRAM(s) Oral daily  ciprofloxacin  0.3% Ophthalmic Solution for Otic Use 5 Drop(s) Right Ear two times a day  dexAMETHasone 0.1% Ophthalmic Solution for OTIC Use 5 Drop(s) Right Ear two times a day  mupirocin 2% Ointment 1 Application(s) Topical two times a day  piperacillin/tazobactam IVPB.. 3.375 Gram(s) IV Intermittent every 8 hours  prenatal multivitamin 1 Tablet(s) Oral daily    MEDICATIONS  (PRN):  acetaminophen     Tablet .. 650 milliGRAM(s) Oral every 6 hours PRN Temp greater or equal to 38C (100.4F), Mild Pain (1 - 3)    Vital Signs Last 24 Hrs  T(C): 36.6 (24 Apr 2024 05:40), Max: 36.7 (23 Apr 2024 12:42)  T(F): 97.9 (24 Apr 2024 05:40), Max: 98.1 (23 Apr 2024 12:42)  HR: 77 (24 Apr 2024 05:40) (70 - 79)  BP: 98/50 (24 Apr 2024 05:40) (95/60 - 101/50)  BP(mean): --  RR: 18 (24 Apr 2024 05:40) (17 - 18)  SpO2: 97% (24 Apr 2024 05:40) (97% - 98%)    Parameters below as of 24 Apr 2024 05:40  Patient On (Oxygen Delivery Method): room air    CAPILLARY BLOOD GLUCOSE    I&O's Summary    PHYSICAL EXAM:  GENERAL: NAD, Resting in bed  Eyes: EOMI, PERRLA, conjunctiva and sclera clear  HENT:  + Erythema, swelling from R ear in post auricular area. Head atraumatic; Moist mucous membranes, normal oropharynx  NECK: Supple, No JVD, no lymphadenopathy, no thyroid nodules or enlargement  CHEST/LUNG: Clear to auscultation bilaterally; No rales, rhonchi, wheezing, or rubs. Unlabored respirations on room air  HEART: Regular rate and rhythm; No murmurs, rubs, or gallops  ABDOMEN: Bowel sounds present; Soft, Nontender, Nondistended.   EXTREMITIES:  2+ Peripheral Pulses, brisk capillary refill. No clubbing, cyanosis, or edema  NERVOUS SYSTEM:  Alert & Oriented X3, non-focal and spontaneous movements of all extremities  SKIN: No rashes or lesions  Psych: Normal behavior, normal affect, normal speech    LABS:                        10.2   6.46  )-----------( 231      ( 24 Apr 2024 06:20 )             31.2     04-23    137  |  104  |  10  ----------------------------<  73  3.7   |  21<L>  |  0.64    Ca    8.8      23 Apr 2024 06:10  Phos  3.5     04-23  Mg     2.20     04-23    TPro  6.5  /  Alb  3.4  /  TBili  0.5  /  DBili  x   /  AST  17  /  ALT  24  /  AlkPhos  146<H>  04-23    Urinalysis Basic - ( 23 Apr 2024 06:10 )    Color: x / Appearance: x / SG: x / pH: x  Gluc: 73 mg/dL / Ketone: x  / Bili: x / Urobili: x   Blood: x / Protein: x / Nitrite: x   Leuk Esterase: x / RBC: x / WBC x   Sq Epi: x / Non Sq Epi: x / Bacteria: x    RADIOLOGY & ADDITIONAL TESTS:    Imaging Personally Reviewed:    Consultant(s) Notes Reviewed:      Care Discussed with Consultants/Other Providers:    Robby Hoffman MD, Internal Medicine Resident Patient is a 38y old  Female who presents with a chief complaint of Mastoiditis    SUBJECTIVE / OVERNIGHT EVENTS: Patient seen and examined at bedside. No overnight events. Patient without acute complaints this AM, just had ear wick replaced by ENT. Denies pain, CP, SOB, subjective fever, chills, n/v/d.    MEDICATIONS  (STANDING):  aspirin  chewable 81 milliGRAM(s) Oral daily  ciprofloxacin  0.3% Ophthalmic Solution for Otic Use 5 Drop(s) Right Ear two times a day  dexAMETHasone 0.1% Ophthalmic Solution for OTIC Use 5 Drop(s) Right Ear two times a day  mupirocin 2% Ointment 1 Application(s) Topical two times a day  piperacillin/tazobactam IVPB.. 3.375 Gram(s) IV Intermittent every 8 hours  prenatal multivitamin 1 Tablet(s) Oral daily    MEDICATIONS  (PRN):  acetaminophen     Tablet .. 650 milliGRAM(s) Oral every 6 hours PRN Temp greater or equal to 38C (100.4F), Mild Pain (1 - 3)    Vital Signs Last 24 Hrs  T(C): 36.6 (24 Apr 2024 05:40), Max: 36.7 (23 Apr 2024 12:42)  T(F): 97.9 (24 Apr 2024 05:40), Max: 98.1 (23 Apr 2024 12:42)  HR: 77 (24 Apr 2024 05:40) (70 - 79)  BP: 98/50 (24 Apr 2024 05:40) (95/60 - 101/50)  BP(mean): --  RR: 18 (24 Apr 2024 05:40) (17 - 18)  SpO2: 97% (24 Apr 2024 05:40) (97% - 98%)    Parameters below as of 24 Apr 2024 05:40  Patient On (Oxygen Delivery Method): room air    CAPILLARY BLOOD GLUCOSE    I&O's Summary    PHYSICAL EXAM:  GENERAL: NAD, Resting in bed  Eyes: EOMI, PERRLA, conjunctiva and sclera clear  HENT:  + Erythema, swelling from R ear in post auricular area. Head atraumatic; Moist mucous membranes, normal oropharynx  NECK: Supple, No JVD, no lymphadenopathy, no thyroid nodules or enlargement  CHEST/LUNG: Clear to auscultation bilaterally; No rales, rhonchi, wheezing, or rubs. Unlabored respirations on room air  HEART: Regular rate and rhythm; No murmurs, rubs, or gallops  ABDOMEN: Bowel sounds present; Soft, Nontender, Nondistended.   EXTREMITIES:  2+ Peripheral Pulses, brisk capillary refill. No clubbing, cyanosis, or edema  NERVOUS SYSTEM:  Alert & Oriented X3, non-focal and spontaneous movements of all extremities  SKIN: No rashes or lesions  Psych: Normal behavior, normal affect, normal speech    LABS:                        10.2   6.46  )-----------( 231      ( 24 Apr 2024 06:20 )             31.2     04-23    137  |  104  |  10  ----------------------------<  73  3.7   |  21<L>  |  0.64    Ca    8.8      23 Apr 2024 06:10  Phos  3.5     04-23  Mg     2.20     04-23    TPro  6.5  /  Alb  3.4  /  TBili  0.5  /  DBili  x   /  AST  17  /  ALT  24  /  AlkPhos  146<H>  04-23    Urinalysis Basic - ( 23 Apr 2024 06:10 )    Color: x / Appearance: x / SG: x / pH: x  Gluc: 73 mg/dL / Ketone: x  / Bili: x / Urobili: x   Blood: x / Protein: x / Nitrite: x   Leuk Esterase: x / RBC: x / WBC x   Sq Epi: x / Non Sq Epi: x / Bacteria: x    RADIOLOGY & ADDITIONAL TESTS:    Imaging Personally Reviewed:    Consultant(s) Notes Reviewed:      Care Discussed with Consultants/Other Providers:    Robby Hoffman MD, Internal Medicine Resident

## 2024-04-24 NOTE — PROGRESS NOTE ADULT - SUBJECTIVE AND OBJECTIVE BOX
OTOLARYNGOLOGY (ENT) PROGRESS NOTE    PATIENT: PADMINI MORALES  MRN: [id number]  : 81  [id number]  DATE OF SERVICE:  24  	  Subjective/ Interval:   : Pt seen and examined at bedside. Reports mild improvement in R otalgia since yesterday, has been steadily improving since initial eval. Wick remains in place. Receiving ciprodex drops, on zosyn. Cx sensitivities show pansensitive except for isolated resistance to ampicillin  : Improvement in tenderness, erythema, and edema. No tenderness to palpation of pinna. EAC remains mildly edematous, unable to fully visualize TM. Wick replaced with drops. Remains afebrile, no leukocytosis. Remains on zosyn, ciprodex gtt, and mupirocin.  : Patient seen and examined at bedside. Feels R ear pain is improving. EAC mildly edematous, improved. Wick replaced w/ drops. Afebrile.    Physical Exam:  Alert, NAD  Nonlabored Respirations RA, no stridor or stertor   Neck: soft, nt, full FOM   Right ear: minimal auricular erythema, scant edema, no TTP, EAC mildly edematous, unable to view full TM, wick replaced  Neuro: CN II-XII grossly intact        LABS                       10.2   6.46  )-----------( 231      ( 2024 06:20 )             31.2        136  |  103  |  10  ----------------------------<  74  3.7   |  20<L>  |  0.59    Ca    8.4      2024 06:20  Phos  3.2       Mg     2.10         TPro  6.5  /  Alb  3.2<L>  /  TBili  0.4  /  DBili  x   /  AST  16  /  ALT  21  /  AlkPhos  145<H>           Coagulation Studies-     Urinalysis Basic - ( 2024 06:20 )    Color: x / Appearance: x / SG: x / pH: x  Gluc: 74 mg/dL / Ketone: x  / Bili: x / Urobili: x   Blood: x / Protein: x / Nitrite: x   Leuk Esterase: x / RBC: x / WBC x   Sq Epi: x / Non Sq Epi: x / Bacteria: x      Endocrine Panel-  Calcium: 8.4 mg/dL ( @ 06:20)                MICROBIOLOGY:  Culture - Ear, Nose and Throat (ENT) (collected 24 @ 12:04)  Source: Ear Ear  Preliminary Report (24 @ 18:50):    Rare Pseudomonas aeruginosa    Rare Klebsiella pneumoniae    Rare Staphylococcus aureus    Rare Staphylococcus auricularis "Susceptibilities not performed"    Rare Streptococcus mitis/oralis group "Susceptibilities not performed"  Organism: Pseudomonas aeruginosa  Klebsiella pneumoniae  Staphylococcus aureus (24 @ 16:14)  Organism: Staphylococcus aureus (24 @ 16:14)      Method Type: YAYA      -  Clindamycin: S <=0.25      -  Erythromycin: S <=0.25      -  Gentamicin: S <=1 Should not be used as monotherapy      -  Oxacillin: S <=0.25 Oxacillin predicts susceptibility for dicloxacillin, methicillin, and nafcillin      -  Penicillin: R 2      -  Rifampin: S <=1 Should not be used as monotherapy      -  Tetracycline: S <=1      -  Trimethoprim/Sulfamethoxazole: S <=0.5/9.5      -  Vancomycin: S 1  Organism: Klebsiella pneumoniae (24 @ 15:50)      Method Type: YAYA      -  Amoxicillin/Clavulanic Acid: S <=8/4      -  Ampicillin: R >16 These ampicillin results predict results for amoxicillin      -  Ampicillin/Sulbactam: S <=4/2      -  Aztreonam: S <=4      -  Cefazolin: S <=2      -  Cefepime: S <=2      -  Cefoxitin: S <=8      -  Ceftriaxone: S <=1      -  Ciprofloxacin: S <=0.25      -  Ertapenem: S <=0.5      -  Gentamicin: S <=2      -  Imipenem: S <=1      -  Levofloxacin: S <=0.5      -  Meropenem: S <=1      -  Piperacillin/Tazobactam: S <=8      -  Tobramycin: S <=2      -  Trimethoprim/Sulfamethoxazole: S <=0.5/9.5  Organism: Pseudomonas aeruginosa (24 @ 15:50)      Method Type: YAYA      -  Aztreonam: S <=4      -  Cefepime: S <=2      -  Ceftazidime: S <=1      -  Ciprofloxacin: S <=0.25      -  Imipenem: S 2      -  Levofloxacin: S <=0.5      -  Meropenem: S <=1      -  Piperacillin/Tazobactam: S <=8      Culture Results:   Rare Pseudomonas aeruginosa  Rare Klebsiella pneumoniae  Rare Staphylococcus aureus  Rare Staphylococcus auricularis "Susceptibilities not performed"  Rare Streptococcus mitis/oralis group "Susceptibilities not performed" (24 @ 12:04)      Culture - Ear, Nose and Throat (ENT) (collected 24 @ 12:04)  Source: Ear Ear  Preliminary Report (24 @ 18:50):    Rare Pseudomonas aeruginosa    Rare Klebsiella pneumoniae    Rare Staphylococcus aureus    Rare Staphylococcus auricularis "Susceptibilities not performed"    Rare Streptococcus mitis/oralis group "Susceptibilities not performed"  Organism: Pseudomonas aeruginosa  Klebsiella pneumoniae  Staphylococcus aureus (24 @ 16:14)  Organism: Staphylococcus aureus (24 @ 16:14)      Method Type: YAYA      -  Clindamycin: S <=0.25      -  Erythromycin: S <=0.25      -  Gentamicin: S <=1 Should not be used as monotherapy      -  Oxacillin: S <=0.25 Oxacillin predicts susceptibility for dicloxacillin, methicillin, and nafcillin      -  Penicillin: R 2      -  Rifampin: S <=1 Should not be used as monotherapy      -  Tetracycline: S <=1      -  Trimethoprim/Sulfamethoxazole: S <=0.5/9.5      -  Vancomycin: S 1  Organism: Klebsiella pneumoniae (24 @ 15:50)      Method Type: YAYA      -  Amoxicillin/Clavulanic Acid: S <=8/4      -  Ampicillin: R >16 These ampicillin results predict results for amoxicillin      -  Ampicillin/Sulbactam: S <=4/2      -  Aztreonam: S <=4      -  Cefazolin: S <=2      -  Cefepime: S <=2      -  Cefoxitin: S <=8      -  Ceftriaxone: S <=1      -  Ciprofloxacin: S <=0.25      -  Ertapenem: S <=0.5      -  Gentamicin: S <=2      -  Imipenem: S <=1      -  Levofloxacin: S <=0.5      -  Meropenem: S <=1      -  Piperacillin/Tazobactam: S <=8      -  Tobramycin: S <=2      -  Trimethoprim/Sulfamethoxazole: S <=0.5/9.5  Organism: Pseudomonas aeruginosa (24 @ 15:50)      Method Type: YAYA      -  Aztreonam: S <=4      -  Cefepime: S <=2      -  Ceftazidime: S <=1      -  Ciprofloxacin: S <=0.25      -  Imipenem: S 2      -  Levofloxacin: S <=0.5      -  Meropenem: S <=1      -  Piperacillin/Tazobactam: S <=8

## 2024-04-25 VITALS
RESPIRATION RATE: 17 BRPM | DIASTOLIC BLOOD PRESSURE: 54 MMHG | OXYGEN SATURATION: 100 % | SYSTOLIC BLOOD PRESSURE: 97 MMHG | HEART RATE: 69 BPM | TEMPERATURE: 98 F

## 2024-04-25 LAB
ALBUMIN SERPL ELPH-MCNC: 3.1 G/DL — LOW (ref 3.3–5)
ALP SERPL-CCNC: 151 U/L — HIGH (ref 40–120)
ALT FLD-CCNC: 26 U/L — SIGNIFICANT CHANGE UP (ref 4–33)
ANION GAP SERPL CALC-SCNC: 13 MMOL/L — SIGNIFICANT CHANGE UP (ref 7–14)
AST SERPL-CCNC: 18 U/L — SIGNIFICANT CHANGE UP (ref 4–32)
BASOPHILS # BLD AUTO: 0.03 K/UL — SIGNIFICANT CHANGE UP (ref 0–0.2)
BASOPHILS NFR BLD AUTO: 0.5 % — SIGNIFICANT CHANGE UP (ref 0–2)
BILIRUB SERPL-MCNC: 0.5 MG/DL — SIGNIFICANT CHANGE UP (ref 0.2–1.2)
BUN SERPL-MCNC: 9 MG/DL — SIGNIFICANT CHANGE UP (ref 7–23)
CALCIUM SERPL-MCNC: 8.4 MG/DL — SIGNIFICANT CHANGE UP (ref 8.4–10.5)
CHLORIDE SERPL-SCNC: 104 MMOL/L — SIGNIFICANT CHANGE UP (ref 98–107)
CO2 SERPL-SCNC: 19 MMOL/L — LOW (ref 22–31)
CREAT SERPL-MCNC: 0.58 MG/DL — SIGNIFICANT CHANGE UP (ref 0.5–1.3)
EGFR: 117 ML/MIN/1.73M2 — SIGNIFICANT CHANGE UP
EOSINOPHIL # BLD AUTO: 0.12 K/UL — SIGNIFICANT CHANGE UP (ref 0–0.5)
EOSINOPHIL NFR BLD AUTO: 2 % — SIGNIFICANT CHANGE UP (ref 0–6)
GLUCOSE SERPL-MCNC: 71 MG/DL — SIGNIFICANT CHANGE UP (ref 70–99)
HCT VFR BLD CALC: 31.8 % — LOW (ref 34.5–45)
HGB BLD-MCNC: 10.6 G/DL — LOW (ref 11.5–15.5)
IANC: 4.12 K/UL — SIGNIFICANT CHANGE UP (ref 1.8–7.4)
IMM GRANULOCYTES NFR BLD AUTO: 0.7 % — SIGNIFICANT CHANGE UP (ref 0–0.9)
LYMPHOCYTES # BLD AUTO: 1.39 K/UL — SIGNIFICANT CHANGE UP (ref 1–3.3)
LYMPHOCYTES # BLD AUTO: 23 % — SIGNIFICANT CHANGE UP (ref 13–44)
MAGNESIUM SERPL-MCNC: 2.1 MG/DL — SIGNIFICANT CHANGE UP (ref 1.6–2.6)
MCHC RBC-ENTMCNC: 27.7 PG — SIGNIFICANT CHANGE UP (ref 27–34)
MCHC RBC-ENTMCNC: 33.3 GM/DL — SIGNIFICANT CHANGE UP (ref 32–36)
MCV RBC AUTO: 83.2 FL — SIGNIFICANT CHANGE UP (ref 80–100)
MONOCYTES # BLD AUTO: 0.34 K/UL — SIGNIFICANT CHANGE UP (ref 0–0.9)
MONOCYTES NFR BLD AUTO: 5.6 % — SIGNIFICANT CHANGE UP (ref 2–14)
NEUTROPHILS # BLD AUTO: 4.12 K/UL — SIGNIFICANT CHANGE UP (ref 1.8–7.4)
NEUTROPHILS NFR BLD AUTO: 68.2 % — SIGNIFICANT CHANGE UP (ref 43–77)
NRBC # BLD: 0 /100 WBCS — SIGNIFICANT CHANGE UP (ref 0–0)
NRBC # FLD: 0 K/UL — SIGNIFICANT CHANGE UP (ref 0–0)
PHOSPHATE SERPL-MCNC: 3.2 MG/DL — SIGNIFICANT CHANGE UP (ref 2.5–4.5)
PLATELET # BLD AUTO: 235 K/UL — SIGNIFICANT CHANGE UP (ref 150–400)
POTASSIUM SERPL-MCNC: 3.7 MMOL/L — SIGNIFICANT CHANGE UP (ref 3.5–5.3)
POTASSIUM SERPL-SCNC: 3.7 MMOL/L — SIGNIFICANT CHANGE UP (ref 3.5–5.3)
PROT SERPL-MCNC: 6.6 G/DL — SIGNIFICANT CHANGE UP (ref 6–8.3)
RBC # BLD: 3.82 M/UL — SIGNIFICANT CHANGE UP (ref 3.8–5.2)
RBC # FLD: 13.6 % — SIGNIFICANT CHANGE UP (ref 10.3–14.5)
SODIUM SERPL-SCNC: 136 MMOL/L — SIGNIFICANT CHANGE UP (ref 135–145)
WBC # BLD: 6.04 K/UL — SIGNIFICANT CHANGE UP (ref 3.8–10.5)
WBC # FLD AUTO: 6.04 K/UL — SIGNIFICANT CHANGE UP (ref 3.8–10.5)

## 2024-04-25 PROCEDURE — 99232 SBSQ HOSP IP/OBS MODERATE 35: CPT

## 2024-04-25 PROCEDURE — 99239 HOSP IP/OBS DSCHRG MGMT >30: CPT | Mod: GC

## 2024-04-25 RX ORDER — CEFEPIME 1 G/1
2 INJECTION, POWDER, FOR SOLUTION INTRAMUSCULAR; INTRAVENOUS
Qty: 24 | Refills: 0
Start: 2024-04-25 | End: 2024-05-02

## 2024-04-25 RX ORDER — CIPROFLOXACIN AND DEXAMETHASONE 3; 1 MG/ML; MG/ML
5 SUSPENSION/ DROPS AURICULAR (OTIC)
Qty: 1 | Refills: 0
Start: 2024-04-25 | End: 2024-05-02

## 2024-04-25 RX ORDER — CEFEPIME 1 G/1
2000 INJECTION, POWDER, FOR SOLUTION INTRAMUSCULAR; INTRAVENOUS ONCE
Refills: 0 | Status: COMPLETED | OUTPATIENT
Start: 2024-04-25 | End: 2024-04-25

## 2024-04-25 RX ORDER — CEFEPIME 1 G/1
INJECTION, POWDER, FOR SOLUTION INTRAMUSCULAR; INTRAVENOUS
Refills: 0 | Status: DISCONTINUED | OUTPATIENT
Start: 2024-04-25 | End: 2024-04-25

## 2024-04-25 RX ORDER — CEFEPIME 1 G/1
2000 INJECTION, POWDER, FOR SOLUTION INTRAMUSCULAR; INTRAVENOUS EVERY 8 HOURS
Refills: 0 | Status: DISCONTINUED | OUTPATIENT
Start: 2024-04-25 | End: 2024-04-25

## 2024-04-25 RX ORDER — CIPROFLOXACIN AND DEXAMETHASONE 3; 1 MG/ML; MG/ML
5 SUSPENSION/ DROPS AURICULAR (OTIC)
Qty: 1 | Refills: 0
Start: 2024-04-25 | End: 2024-05-01

## 2024-04-25 RX ADMIN — Medication 81 MILLIGRAM(S): at 11:17

## 2024-04-25 RX ADMIN — Medication 5 DROP(S): at 08:34

## 2024-04-25 RX ADMIN — MUPIROCIN 1 APPLICATION(S): 20 OINTMENT TOPICAL at 08:34

## 2024-04-25 RX ADMIN — Medication 1 TABLET(S): at 11:17

## 2024-04-25 RX ADMIN — PIPERACILLIN AND TAZOBACTAM 25 GRAM(S): 4; .5 INJECTION, POWDER, LYOPHILIZED, FOR SOLUTION INTRAVENOUS at 03:09

## 2024-04-25 RX ADMIN — CEFEPIME 100 MILLIGRAM(S): 1 INJECTION, POWDER, FOR SOLUTION INTRAMUSCULAR; INTRAVENOUS at 11:17

## 2024-04-25 NOTE — PROGRESS NOTE ADULT - PROBLEM SELECTOR PLAN 3
Diet: Soft and bite sized  Dvt ppx: N/A  Ethics: Full code

## 2024-04-25 NOTE — PROVIDER CONTACT NOTE (OTHER) - BACKGROUND
39 y/o female admitted for disorder of cartilage / chondritis with no pertinent PMH; 17 wks pregnant
37 y/o female admitted for disorder of cartilage / chondritis with no pertinent PMH; 17 wks pregnant
37 y/o female admitted for disorder of cartilage / chondritis with no pertinent PMH; 17 wks pregnant
39 y/o female admitted for disorder of cartilage / chondritis with no pertinent PMH; 17 wks pregnant
Patient admitted for mastoiditis
Patient admitted for mastoiditis
39 y/o female admitted for disorder of cartilage / chondritis with no pertinent PMH; 17 wks pregnant
39 y/o female admitted for disorder of cartilage / chondritis with no pertinent PMH; 17 wks pregnant
37 y/o female admitted for disorder of cartilage / chondritis with no pertinent PMH; 17 wks pregnant

## 2024-04-25 NOTE — PROVIDER CONTACT NOTE (OTHER) - RECOMMENDATIONS
Notify MD
notify MD
notify MD.
notify MD
Notify MD, Recheck blood pressure
notify MD
notify MD
notify MD, Retake in one hour.
notify MD.

## 2024-04-25 NOTE — PROGRESS NOTE ADULT - ATTENDING COMMENTS
Patient seen and examined, care d/w HS.     In summary 38F , currently 17 wks pregnant who presents with R ear pain since  that has been progressing, no fevers home, no prior ear infxn.       # Mastoiditis/cellulitis/chondritis/external ear infection  - no need for further imaging. Discussed with ID and ENT   - ear culture currently positive for Rare Pseudomonas aeruginosa, Rare Klebsiella pneumoniae, Rare Staphylococcus aureus  - ID consulted, dc vanc. Continue zosyn.   - continue ciprodex drops as per ENT recs. Wick mgmt per ENT   - ID may recommend prolonged course of antibiotics. May need PICC or Midline with home infusion set up     # Hypokalemia  - trend and replete as needed      # Pregnancy  - prenatal vitamins and asa (reports borderline HTN, advised by OB to take asa 81 mg daily)  - OP Ob-gyn f/u for routine care    Dc pending infectious w/u and decision re: abx and duration pending evaluation
Patient seen and examined, care d/w HS.     In summary 38F , currently 17 wks pregnant who presents with R ear pain since Wednesday that has been progressing, no fevers home, no prior ear infxn.       # Mastoiditis/cellulitis/chondritis/external ear infection  - f/u ear culture can deescalate once speciation/sensitives return   - no need for further imaging. Discussed with ID and ENT    - MRSA screen negative, dc vanc   - continue ciprodex drops as per ENT recs   - continue zosyn as per ID recs    # Hypokalemia  - repleted, now improved     # Pregnancy  - prenatal vitamins and asa (reports borderline HTN, advised by OB to take asa 81 mg daily)  - OP Ob-gyn f/u for routine care    Dc pending infectious w/u and decision re: abx and duration pending evaluation
Patient seen and examined, care d/w HS.     In summary 38F , currently 17 wks pregnant who presents with R ear pain since Wednesday that has been progressing, no fevers home, no prior ear infxn.       # Mastoiditis/cellulitis/chondritis/external ear infection  - no need for further imaging. Discussed with ID and ENT   - ear culture currently positive for Rare Pseudomonas aeruginosa, Rare Klebsiella pneumoniae, Rare Staphylococcus aureus  - f/u ear culture can deescalate once sensitives fully return   - ID consulted, MRSA screen negative, dc vanc. Continue zosyn.    - continue ciprodex drops as per ENT recs   - DC once ID can give PO regimen and duration given patient's overall improvement     # Hypokalemia  - trend and replete as needed      # Pregnancy  - prenatal vitamins and asa (reports borderline HTN, advised by OB to take asa 81 mg daily)  - OP Ob-gyn f/u for routine care    Dc pending infectious w/u and decision re: abx and duration pending evaluation
Patient seen and examined, care d/w HS.     In summary 38F , currently 17 wks pregnant who presents with R ear pain since Wednesday that has been progressing, no fevers home, no prior ear infxn.       # Mastoiditis/cellulitis/chondritis/external ear infection  - vanco/zosyn for now  - f/u ear culture can deescalate once speciation/sensitives return   - check MRSA screen  - no need for further imaging. Discussed with ID and ENT      # Hypokalemia  - repleting     # Pregnancy  - prenatal vitamins and asa (reports borderline HTN, advised by OB to take asa 81 mg daily)  - OP Ob-gyn f/u for routine care    Dc pending infectious w/u and decision re: abx and duration pending evaluation
Patient seen and examined, care d/w HS.     In summary 38F , currently 17 wks pregnant who presents with R ear pain since  that has been progressing, no fevers home, no prior ear infxn.       #severe acute otitis externa, cellulitis  - no need for further imaging. Discussed with ID and ENT   - ear culture currently positive for Rare Pseudomonas aeruginosa, Rare Klebsiella pneumoniae, Rare Staphylococcus aureus  - ID consulted, dc vanc. Continue zosyn.   - continue ciprodex drops as per ENT recs   - discussed with ENT, low risk for mastoiditis   - final antibiotic plan per ID, will have midline placed, antibiotic infusion set up for home and work towards discharge     # Pregnancy  - prenatal vitamins and asa (reports borderline HTN, advised by OB to take asa 81 mg daily)  - OP Ob-gyn f/u for routine care    Dc pending midline placement and set up of home antibiotics
Patient seen and examined, care d/w HS.     In summary 38F , currently 17 wks pregnant who presents with R ear pain since  that has been progressing, no fevers home, no prior ear infxn.       #severe acute otitis externa, cellulitis  - no need for further imaging. Discussed with ID and ENT   - ear culture currently positive for Rare Pseudomonas aeruginosa, Rare Klebsiella pneumoniae, Rare Staphylococcus aureus  - ID consulted, dc vanc. Continue zosyn.   - continue ciprodex drops as per ENT recs   - discussed with ENT, low risk for mastoiditis. ENT to remove wick prior to dc   - final antibiotic recs appreciated per ID. Midline placed. Home antibiotic infusion set up  - DC today     # Pregnancy  - prenatal vitamins and asa (reports borderline HTN, advised by OB to take asa 81 mg daily)  - OP Ob-gyn f/u for routine care    Dc pending midline placement and set up of home antibiotics

## 2024-04-25 NOTE — PROGRESS NOTE ADULT - PROVIDER SPECIALTY LIST ADULT
ENT
Infectious Disease
Infectious Disease
ENT
ENT
Infectious Disease
ENT
Infectious Disease
Infectious Disease
Internal Medicine

## 2024-04-25 NOTE — PROGRESS NOTE ADULT - SUBJECTIVE AND OBJECTIVE BOX
Mary Lara MD   Internal Medicine, PGY 2  Contact via TEAMS.     SUBJECTIVE / OVERNIGHT EVENTS:  - Pt seen and examined at bedside  - NAHEED    MEDICATIONS  (STANDING):  aspirin  chewable 81 milliGRAM(s) Oral daily  ciprofloxacin  0.3% Ophthalmic Solution for Otic Use 5 Drop(s) Right Ear two times a day  dexAMETHasone 0.1% Ophthalmic Solution for OTIC Use 5 Drop(s) Right Ear two times a day  mupirocin 2% Ointment 1 Application(s) Topical two times a day  piperacillin/tazobactam IVPB.. 3.375 Gram(s) IV Intermittent every 8 hours  prenatal multivitamin 1 Tablet(s) Oral daily    MEDICATIONS  (PRN):  acetaminophen     Tablet .. 650 milliGRAM(s) Oral every 6 hours PRN Temp greater or equal to 38C (100.4F), Mild Pain (1 - 3)          PHYSICAL EXAM:  Vital Signs Last 24 Hrs  T(C): 36.4 (25 Apr 2024 05:35), Max: 36.5 (24 Apr 2024 12:35)  T(F): 97.6 (25 Apr 2024 05:35), Max: 97.7 (24 Apr 2024 12:35)  HR: 69 (25 Apr 2024 05:35) (67 - 72)  BP: 97/54 (25 Apr 2024 05:35) (97/54 - 102/48)  BP(mean): --  RR: 17 (25 Apr 2024 05:35) (17 - 17)  SpO2: 100% (25 Apr 2024 05:35) (99% - 100%)    Parameters below as of 25 Apr 2024 05:35  Patient On (Oxygen Delivery Method): room air        CAPILLARY BLOOD GLUCOSE        I&O's Summary      CONSTITUTIONAL: NAD  HEENT: NC/AT  RESPIRATORY: Normal respiratory effort; lungs are clear to auscultation bilaterally  CARDIOVASCULAR: Regular rate and rhythm, normal S1 and S2, no murmur/rub/gallop; No lower extremity edema; Peripheral pulses are 2+ bilaterally  ABDOMEN: Nontender to palpation, normoactive bowel sounds, no rebound/guarding; No hepatosplenomegaly  MUSCLOSKELETAL: no clubbing or cyanosis of digits; no joint swelling or tenderness to palpation  EXTREMITIES: no peripheral edema, distal pulses intact   NEURO: no focal deficits   PSYCH: A+O to person, place, and time; affect appropriate    LABS:                        10.2   6.46  )-----------( 231      ( 24 Apr 2024 06:20 )             31.2     04-24    136  |  103  |  10  ----------------------------<  74  3.7   |  20<L>  |  0.59    Ca    8.4      24 Apr 2024 06:20  Phos  3.2     04-24  Mg     2.10     04-24    TPro  6.5  /  Alb  3.2<L>  /  TBili  0.4  /  DBili  x   /  AST  16  /  ALT  21  /  AlkPhos  145<H>  04-24          Urinalysis Basic - ( 24 Apr 2024 06:20 )    Color: x / Appearance: x / SG: x / pH: x  Gluc: 74 mg/dL / Ketone: x  / Bili: x / Urobili: x   Blood: x / Protein: x / Nitrite: x   Leuk Esterase: x / RBC: x / WBC x   Sq Epi: x / Non Sq Epi: x / Bacteria: x          IMAGING:    [X] All pertinent imaging reviewed by me Mary Lara MD   Internal Medicine, PGY 2  Contact via TEAMS.     SUBJECTIVE / OVERNIGHT EVENTS:  - Pt seen and examined at bedside  - [place]  - Endorses no ear pain, no issues hearing. Denies fevers, chills, SOB, N/V, chest pain, abdominal pain, diarrhea.     MEDICATIONS  (STANDING):  aspirin  chewable 81 milliGRAM(s) Oral daily  ciprofloxacin  0.3% Ophthalmic Solution for Otic Use 5 Drop(s) Right Ear two times a day  dexAMETHasone 0.1% Ophthalmic Solution for OTIC Use 5 Drop(s) Right Ear two times a day  mupirocin 2% Ointment 1 Application(s) Topical two times a day  piperacillin/tazobactam IVPB.. 3.375 Gram(s) IV Intermittent every 8 hours  prenatal multivitamin 1 Tablet(s) Oral daily    MEDICATIONS  (PRN):  acetaminophen     Tablet .. 650 milliGRAM(s) Oral every 6 hours PRN Temp greater or equal to 38C (100.4F), Mild Pain (1 - 3)          PHYSICAL EXAM:  Vital Signs Last 24 Hrs  T(C): 36.4 (25 Apr 2024 05:35), Max: 36.5 (24 Apr 2024 12:35)  T(F): 97.6 (25 Apr 2024 05:35), Max: 97.7 (24 Apr 2024 12:35)  HR: 69 (25 Apr 2024 05:35) (67 - 72)  BP: 97/54 (25 Apr 2024 05:35) (97/54 - 102/48)  BP(mean): --  RR: 17 (25 Apr 2024 05:35) (17 - 17)  SpO2: 100% (25 Apr 2024 05:35) (99% - 100%)    Parameters below as of 25 Apr 2024 05:35  Patient On (Oxygen Delivery Method): room air        CAPILLARY BLOOD GLUCOSE        I&O's Summary      CONSTITUTIONAL: NAD  HEENT: NC/AT  RESPIRATORY: Normal respiratory effort; lungs are clear to auscultation bilaterally  CARDIOVASCULAR: Regular rate and rhythm, normal S1 and S2, no murmur/rub/gallop; No lower extremity edema; Peripheral pulses are 2+ bilaterally  ABDOMEN: Nontender to palpation, normoactive bowel sounds, no rebound/guarding; No hepatosplenomegaly  MUSCLOSKELETAL: no clubbing or cyanosis of digits; no joint swelling or tenderness to palpation  EXTREMITIES: no peripheral edema, distal pulses intact   NEURO: no focal deficits   PSYCH: A+O to person, place, and time; affect appropriate    LABS:                        10.2   6.46  )-----------( 231      ( 24 Apr 2024 06:20 )             31.2     04-24    136  |  103  |  10  ----------------------------<  74  3.7   |  20<L>  |  0.59    Ca    8.4      24 Apr 2024 06:20  Phos  3.2     04-24  Mg     2.10     04-24    TPro  6.5  /  Alb  3.2<L>  /  TBili  0.4  /  DBili  x   /  AST  16  /  ALT  21  /  AlkPhos  145<H>  04-24          Urinalysis Basic - ( 24 Apr 2024 06:20 )    Color: x / Appearance: x / SG: x / pH: x  Gluc: 74 mg/dL / Ketone: x  / Bili: x / Urobili: x   Blood: x / Protein: x / Nitrite: x   Leuk Esterase: x / RBC: x / WBC x   Sq Epi: x / Non Sq Epi: x / Bacteria: x          IMAGING:    [X] All pertinent imaging reviewed by me

## 2024-04-25 NOTE — PROVIDER CONTACT NOTE (OTHER) - SITUATION
/42
Patients blood pressure 102/48
/50
/55
BP 97/51
BP 98/50
BP 97/56
/54
Patients blood pressure 97/54

## 2024-04-25 NOTE — PROGRESS NOTE ADULT - ASSESSMENT
38F  17wkg pw R ear pain c/w severe AOE, improving exam with persistent mild EAC edema with macerated canal skin, partial view of TM obtained without effusion. No indication for ear wick at this time. Low concern for clinically significant mastoiditis.    - continue ciprodex gtt  - continue abx per ID  - cx sensitivities shows pansensitive to all but ampicillin   - dry ear precautions : do not get water in ear, when showering use a cotton ball with Vaseline to keep ear dry, d/w patient   - will arrange follow up next week

## 2024-04-25 NOTE — PROVIDER CONTACT NOTE (OTHER) - DATE AND TIME:
23-Apr-2024 22:31
22-Apr-2024 06:30
22-Apr-2024 23:24
25-Apr-2024 06:19
20-Apr-2024 22:15
24-Apr-2024 21:54
22-Apr-2024 14:57
23-Apr-2024 22:30
24-Apr-2024 05:49

## 2024-04-25 NOTE — PROVIDER CONTACT NOTE (OTHER) - ASSESSMENT
BP 98/50 HR 77 RR 18 oxygen sat RA 97%; No acute distress noted.
/42 HR 72 RR 17 oxygen sat RA 98%; No acute distress noted.
No acute distress noted.
/50 HR 72 RR 17 oxygen sat RA 98%; No acute distress noted.
/55 T 36.3 HR 78 RR 17 oxygen sat RA 99%; No acute distress noted.
BP 97/54, HR 69, resp 17, 02 100%, temp 97.6. Patient resting comfortably no complaints of dizziness. Patient states her blood pressure runs low.
/48, HR 67, resp 17, 02 99%, temp 97.7. Patient resting comfortably no complaints of dizziness. Patient states her blood pressure runs low
BP 97/51 T 97.8 HR 75 RR 17 oxygen sat RA 98%; not in distress, alert and responsive
BP 97/56 T 36.6 HR 76 RR 18 oxygen sat %; No acute distress noted.

## 2024-04-25 NOTE — PROGRESS NOTE ADULT - PROBLEM SELECTOR PLAN 1
Patient presented with 3 days of R ear pain, erythema, and discharge. Concern for mastoiditis vs Perichondritis vs chondritis of right pinna vs otitis externa. POCUS head and neck and in ED, no abscess noted  - ENT and ID consulted, recs appreciated  - MRI of head, no gadolinium - not needed  - C/w zosyn; vancomycin--dc'd 4/21 as per ID  - Cipro, dex ear drops for 7 days total  - Mupirocin ointment  - F/u culture - rare pseudomonas, klebsiella  - per ENT low concern for mastoiditis   - Discharge on cefepime 2g q8h for 2 weeks total (4/19 - 5/2)  - Midline for IV abx access prior to discharge Patient presented with 3 days of R ear pain, erythema, and discharge. Concern for mastoiditis vs Perichondritis vs chondritis of right pinna vs otitis externa. POCUS head and neck and in ED, no abscess noted  - ENT and ID consulted, recs appreciated  - MRI of head, no gadolinium - not needed  - C/w zosyn; vancomycin--dc'd 4/21 as per ID  - Cipro, dex ear drops for 7 days total  - Mupirocin ointment  - F/u culture - rare pseudomonas, klebsiella  - per ENT low concern for mastoiditis   - Discharge on cefepime 2g q12h for 2 weeks total (4/19 - 5/2)  - Midline for IV abx access prior to discharge

## 2024-04-25 NOTE — PROGRESS NOTE ADULT - PROBLEM SELECTOR PROBLEM 1
Mastoiditis of right side

## 2024-04-25 NOTE — PROVIDER CONTACT NOTE (OTHER) - ACTION/TREATMENT ORDERED:
MD notified and aware. No interventions at this time.
MD made aware, no new interventions
MD made aware, no new interventions
MD notified and aware. No interventions at this time. Continue to monitor.
MD made aware and with no interventions at this time
MD notified and aware. No interventions at this time. Continue to monitor.

## 2024-04-25 NOTE — PROGRESS NOTE ADULT - SUBJECTIVE AND OBJECTIVE BOX
ENT Progress Note    Interval: ear fullness but auricular cellulitis much improved. AVSS.       MEDICATIONS  (STANDING):  aspirin  chewable 81 milliGRAM(s) Oral daily  cefepime   IVPB      cefepime   IVPB 2000 milliGRAM(s) IV Intermittent once  cefepime   IVPB 2000 milliGRAM(s) IV Intermittent every 8 hours  ciprofloxacin  0.3% Ophthalmic Solution for Otic Use 5 Drop(s) Right Ear two times a day  dexAMETHasone 0.1% Ophthalmic Solution for OTIC Use 5 Drop(s) Right Ear two times a day  mupirocin 2% Ointment 1 Application(s) Topical two times a day  prenatal multivitamin 1 Tablet(s) Oral daily    MEDICATIONS  (PRN):  acetaminophen     Tablet .. 650 milliGRAM(s) Oral every 6 hours PRN Temp greater or equal to 38C (100.4F), Mild Pain (1 - 3)        Vital Signs Last 24 Hrs  T(C): 36.4 (25 Apr 2024 05:35), Max: 36.5 (24 Apr 2024 12:35)  T(F): 97.6 (25 Apr 2024 05:35), Max: 97.7 (24 Apr 2024 12:35)  HR: 69 (25 Apr 2024 05:35) (67 - 72)  BP: 97/54 (25 Apr 2024 05:35) (97/54 - 102/48)  BP(mean): --  RR: 17 (25 Apr 2024 05:35) (17 - 17)  SpO2: 100% (25 Apr 2024 05:35) (99% - 100%)    Parameters below as of 25 Apr 2024 05:35  Patient On (Oxygen Delivery Method): room air        Physical Exam:  Alert, NAD  Nonlabored Respirations RA, no stridor or stertor   Right ear: pinna wnl, otowick removed, EAC with sloughing skin & bloody debris, scant otorrhea suctioned, TM partially visualized without effusion   Neuro: CN II-XII grossly intact                                 10.6   6.04  )-----------( 235      ( 25 Apr 2024 07:22 )             31.8    04-25    136  |  104  |  9   ----------------------------<  71  3.7   |  19<L>  |  0.58    Ca    8.4      25 Apr 2024 07:22  Phos  3.2     04-25  Mg     2.10     04-25    TPro  6.6  /  Alb  3.1<L>  /  TBili  0.5  /  DBili  x   /  AST  18  /  ALT  26  /  AlkPhos  151<H>  04-25

## 2024-04-25 NOTE — PROGRESS NOTE ADULT - SUBJECTIVE AND OBJECTIVE BOX
CC: F/U for OE    Saw/spoke to patient. No fevers, no chills. No new complaints.    Allergies  No Known Allergies    ANTIMICROBIALS:  cefepime   IVPB    cefepime   IVPB 2000 every 8 hours    PE:    Vital Signs Last 24 Hrs  T(C): 36.4 (25 Apr 2024 05:35), Max: 36.5 (24 Apr 2024 21:44)  T(F): 97.6 (25 Apr 2024 05:35), Max: 97.7 (24 Apr 2024 21:44)  HR: 69 (25 Apr 2024 05:35) (67 - 69)  BP: 97/54 (25 Apr 2024 05:35) (97/54 - 102/48)  RR: 17 (25 Apr 2024 05:35) (17 - 17)  SpO2: 100% (25 Apr 2024 05:35) (99% - 100%)    Gen: AOx3, NAD, non-toxic  CV: Nontachycardic  Resp: Breathing comfortably, RA  Abd: Soft, nontender  IV/Skin: No thrombophlebitis    LABS:                        10.6   6.04  )-----------( 235      ( 25 Apr 2024 07:22 )             31.8     04-25    136  |  104  |  9   ----------------------------<  71  3.7   |  19<L>  |  0.58    Ca    8.4      25 Apr 2024 07:22  Phos  3.2     04-25  Mg     2.10     04-25    TPro  6.6  /  Alb  3.1<L>  /  TBili  0.5  /  DBili  x   /  AST  18  /  ALT  26  /  AlkPhos  151<H>  04-25    Urinalysis Basic - ( 25 Apr 2024 07:22 )    Color: x / Appearance: x / SG: x / pH: x  Gluc: 71 mg/dL / Ketone: x  / Bili: x / Urobili: x   Blood: x / Protein: x / Nitrite: x   Leuk Esterase: x / RBC: x / WBC x   Sq Epi: x / Non Sq Epi: x / Bacteria: x    MICROBIOLOGY:    Ear Ear  04-19-24   Rare Pseudomonas aeruginosa  Rare Klebsiella pneumoniae  Rare Staphylococcus aureus  Rare Staphylococcus auricularis "Susceptibilities not performed"  Rare Streptococcus mitis/oralis group "Susceptibilities not performed"  --  Pseudomonas aeruginosa  Klebsiella pneumoniae  Staphylococcus aureus    RADIOLOGY:    4/19 [facility]    FINDINGS/  IMPRESSION:  Focused evaluation at the site of clinical concern reveals a normal lymph   node of 6 mm short axis. No fluid collection.

## 2024-04-25 NOTE — PROGRESS NOTE ADULT - ASSESSMENT
39 yo F  currently 17 weeks pregnant presenting for worsening R ear pain x2 days, associated with erythema, warmth, and tenderness in front of and behind the ear She also noticed thin yellow discharge from the ear, difficulty opening the mouth, and muffled hearing  No fevers/chills  No recent illnesses or sick contacts  ENT evaluated the patient and diagnosed patient with right chondritis, right otitis externa  Note ENT note with low suspicion for clinical mastoiditis documented on     Micro:  Ear Cx Klebsiella, pseudomonas, staph aureus (sensitivities testing)    #Preauricular and postauricular cellulitis  #R otitis externa    Recommendations:  - Zosyn 3.375g q 8  - When discharge planning, send out on midline with Cefepime 2g q 12 (from start of Zosyn) to complete 2 weeks including Zosyn days  - Okay for midline as long as no new signs infection/fevers/BCXs remain clear  - As outpatient, check CBC, BUN, Cr, LFTs weekly, fax to [phone], Dr. Quevedo  - Follow up with me in ID clinic in 6 weeks  - F/U ENT  - Cefepime/cephalosporins considered generally safe in pregnancy, discussed with patient    Rey Quevedo MD  Contact on TEAMS messaging from 9am - 5pm  From 5pm-9am, on weekends, or if no response call [phone]

## 2024-04-25 NOTE — PROGRESS NOTE ADULT - REASON FOR ADMISSION
Mastoidis
Mastoidis
Mastoiditis
Mastoidis
otitis externa with auricular cellulitis
Mastoidis
Mastoidis
OE/Perichondritis
otitis externa with auricular & periauricular cellulitis
Mastoidis
Mastoiditis
Mastoiditis
Mastoidis

## 2024-04-25 NOTE — PROVIDER CONTACT NOTE (OTHER) - NAME OF MD/NP/PA/DO NOTIFIED:
MD Donahue
Cee Donahue
LESTER BLAKE
MD Donahue
MD Donahue
MD Donahue, Cee
Robby Hoffman MD
Cee Donahue
MD Donahue

## 2024-04-29 ENCOUNTER — NON-APPOINTMENT (OUTPATIENT)
Age: 39
End: 2024-04-29

## 2024-05-03 ENCOUNTER — APPOINTMENT (OUTPATIENT)
Dept: OTOLARYNGOLOGY | Facility: CLINIC | Age: 39
End: 2024-05-03
Payer: COMMERCIAL

## 2024-05-03 DIAGNOSIS — H69.91 UNSPECIFIED EUSTACHIAN TUBE DISORDER, RIGHT EAR: ICD-10-CM

## 2024-05-03 PROCEDURE — 99203 OFFICE O/P NEW LOW 30 MIN: CPT

## 2024-05-03 NOTE — END OF VISIT
[FreeTextEntry3] : I, Dr. Catalan personally performed the evaluation and management (E/M) services including all necessary procedures, for this new patient. That E/M includes conducting the clinically appropriate initial history &/or exam, assessing all conditions, and establishing the plan of care. Today, my RADHA, Char Elkins, was here to observe &/or participate in the visit & follow plan of care established by me.

## 2024-05-03 NOTE — ASSESSMENT
[FreeTextEntry1] : Patient pregnant recently seen in [facility], [place] treated with IV antibiotics and still has an IV in for severe right ear pain and infection she is doing fantastic her ear looks fine she just finished her antibiotics she will call a visiting nurse services to come remove her IV no further acute interventions indicated she will follow-up with us as needed.

## 2024-05-03 NOTE — HISTORY OF PRESENT ILLNESS
[de-identified] : Patient was in the hospital for 4 days for an ear infection that was severe. She is currently pregnant so was treated with IV antibiotics. She feels fully resolved now, no pain in the ear, no drainage, no changes in hearing. She still has the IV in but is due to take it out  Denies nasal issues like congestion or runny nose

## 2024-08-16 ENCOUNTER — LABORATORY RESULT (OUTPATIENT)
Age: 39
End: 2024-08-16

## 2024-08-16 ENCOUNTER — APPOINTMENT (OUTPATIENT)
Dept: OTOLARYNGOLOGY | Facility: CLINIC | Age: 39
End: 2024-08-16
Payer: COMMERCIAL

## 2024-08-16 VITALS — BODY MASS INDEX: 32.79 KG/M2 | HEIGHT: 60 IN | WEIGHT: 167 LBS

## 2024-08-16 PROCEDURE — 99213 OFFICE O/P EST LOW 20 MIN: CPT

## 2024-08-16 PROCEDURE — 92504 EAR MICROSCOPY EXAMINATION: CPT | Mod: 25

## 2024-08-16 RX ORDER — MUPIROCIN 20 MG/G
2 OINTMENT TOPICAL
Qty: 2 | Refills: 0 | Status: ACTIVE | COMMUNITY
Start: 2024-08-16 | End: 1900-01-01

## 2024-08-16 RX ORDER — CLOTRIMAZOLE AND BETAMETHASONE DIPROPIONATE 10; .5 MG/G; MG/G
1-0.05 CREAM TOPICAL
Qty: 1 | Refills: 0 | Status: ACTIVE | COMMUNITY
Start: 2024-08-16 | End: 1900-01-01

## 2024-08-16 RX ORDER — PNV 24/IRON AA CHEL/FOLIC ACID 30 MG-975
TABLET ORAL
Refills: 0 | Status: ACTIVE | COMMUNITY

## 2024-08-16 NOTE — HISTORY OF PRESENT ILLNESS
[de-identified] : 39 year old female currently 32 weeks pregnant.  Referred by LIZZETH for follow up evaluation of right ear infections.  States hospitalized x 1 week and received IV abx. Discharged on PO abx, now complete. No current right otalgia or otorrhea. Notes decreased hearing bilaterally since infection.  Now with left ear itchiness and pain since Wednesdays. Denies otorrhea and fever.

## 2024-08-16 NOTE — HISTORY OF PRESENT ILLNESS
[de-identified] : 39 year old female currently 32 weeks pregnant.  Referred by LIZZETH for follow up evaluation of right ear infections.  States hospitalized x 1 week and received IV abx. Discharged on PO abx, now complete. No current right otalgia or otorrhea. Notes decreased hearing bilaterally since infection.  Now with left ear itchiness and pain since Wednesdays. Denies otorrhea and fever.

## 2024-08-16 NOTE — REASON FOR VISIT
[Initial Evaluation] : an initial evaluation for [FreeTextEntry2] : ear infection [Interpreters_IDNumber] : [id number] [Interpreters_FullName] : Ashlee [TWNoteComboBox1] : Monegasque

## 2024-08-16 NOTE — PHYSICAL EXAM
[Binocular Microscopic Exam] : Binocular microscopic exam was performed [Rinne Test Air Conduction Persists > Bone Conduction Right] : air conduction greater than bone conduction on the right [Rinne Test Air Conduction Persists > Bone Conduction Left] : air conduction greater than bone conduction on the left 143.9 [Hearing Casper Test (Tuning Fork On Forehead)] : no lateralization of tone [Hearing Loss Right Only] : normal [Hearing Loss Left Only] : normal [Nystagmus] : ~T no ~M nystagmus was seen [Fukuda Step Test] : Fukuda Step Test was Negative [Romberg's Sign] : Romberg's sign was absent [Fistula Sign] : Fistula Sign: Negative [Past-Pointing] : Past-Pointing: Negative [Ronda-Hallmoiseske] : [place]-Hallpike: Negative [FreeTextEntry9] : eczema, erythema, edema, exudate - cultured and treated [Normal] : no rashes

## 2024-08-16 NOTE — PROCEDURE
[Other ___] : [unfilled] [Same] : same as the Pre Op Dx. [] : Binocular Microscopy [FreeTextEntry6] : Operative microscope was used to examine the ear canal, ear drum and visible middle ear landmarks. Adequate exam would not have been possible without the use of a microscope. Findings are described.

## 2024-08-16 NOTE — REASON FOR VISIT
[Initial Evaluation] : an initial evaluation for [FreeTextEntry2] : ear infection [Interpreters_IDNumber] : [id number] [Interpreters_FullName] : Ashlee [TWNoteComboBox1] : [place]

## 2024-08-16 NOTE — PHYSICAL EXAM
[Binocular Microscopic Exam] : Binocular microscopic exam was performed [Rinne Test Air Conduction Persists > Bone Conduction Right] : air conduction greater than bone conduction on the right [Rinne Test Air Conduction Persists > Bone Conduction Left] : air conduction greater than bone conduction on the left [Hearing Casper Test (Tuning Fork On Forehead)] : no lateralization of tone [Hearing Loss Right Only] : normal [Hearing Loss Left Only] : normal [Nystagmus] : ~T no ~M nystagmus was seen [Fukuda Step Test] : Fukuda Step Test was Negative [Romberg's Sign] : Romberg's sign was absent [Fistula Sign] : Fistula Sign: Negative [Past-Pointing] : Past-Pointing: Negative [Ronda-Hallmoiseske] : Dike-Hallpike: Negative [FreeTextEntry9] : eczema, erythema, edema, exudate - cultured and treated [Normal] : no rashes

## 2024-08-20 RX ORDER — OFLOXACIN OTIC 3 MG/ML
0.3 SOLUTION AURICULAR (OTIC)
Qty: 1 | Refills: 2 | Status: ACTIVE | COMMUNITY
Start: 2024-08-20 | End: 1900-01-01

## 2024-08-21 ENCOUNTER — APPOINTMENT (OUTPATIENT)
Dept: OTOLARYNGOLOGY | Facility: CLINIC | Age: 39
End: 2024-08-21
Payer: COMMERCIAL

## 2024-08-21 VITALS
DIASTOLIC BLOOD PRESSURE: 66 MMHG | BODY MASS INDEX: 33.38 KG/M2 | OXYGEN SATURATION: 97 % | RESPIRATION RATE: 17 BRPM | WEIGHT: 170 LBS | SYSTOLIC BLOOD PRESSURE: 101 MMHG | HEIGHT: 60 IN | HEART RATE: 72 BPM

## 2024-08-21 DIAGNOSIS — H60.312 DIFFUSE OTITIS EXTERNA, LEFT EAR: ICD-10-CM

## 2024-08-21 PROCEDURE — 99213 OFFICE O/P EST LOW 20 MIN: CPT

## 2024-08-21 PROCEDURE — 92504 EAR MICROSCOPY EXAMINATION: CPT

## 2024-08-21 NOTE — REASON FOR VISIT
[Subsequent Evaluation] : a subsequent evaluation for [[employer] ] : provided by [employer]   [FreeTextEntry2] : ear infection  [Interpreters_IDNumber] : [id number] [Interpreters_FullName] : Calixto  [TWNoteComboBox1] : Finnish

## 2024-08-21 NOTE — HISTORY OF PRESENT ILLNESS
[de-identified] : 40yo woman, currently 32 wks pregnancy, with resent R OE- resolved. She currently has Left OE, cultures taken 08/16, treated topically and prescribed mupirocin and beclo/clotr cream. Cultures (+) States she is no longer having otorrhea  Reports mild left otalgia and constant itch  Continues to use topical ointments to ear  Hearing has improved.  Patient denies bleeding, fevers, hearing loss, ear surgeries, tinnitus, dizziness, vertigo, headaches related to hearing.

## 2024-08-21 NOTE — DATA REVIEWED
[de-identified] : Numerous Pseudomonas aeruginosa Rare Coag Negative Staphylococcus "Susceptibilities not performed" Few Serratia marcescens   ***STAIN REPORT*** Gram Stain  [] Reported Date/Time: 8/17/2024 22:18 EDT No polymorphonuclear cells seen per low power field Numerous Gram Negative Rods per oil power field Numerous Gram Positive Cocci in Clusters per oil power field  ***SUSCEPTIBILITY RESULTS***                   Pseaer Antibiotic        MDIL          MINT Aztreonam         <=4           Susceptible Cefepime          <=2           Susceptible Ceftazidime       <=1           Susceptible Ciprofloxacin     <=0.25        Susceptible Imipenem          2             Susceptible Levofloxacin      <=0.5         Susceptible Meropenem         <=1           Susceptible Piperacillin/     <=8           Susceptible Tazobactam                    Sermar Antibiotic        MDIL          MINT Amoxicillin/      >16/8         Resistant Clavulanate Ampicillin        >16 [f1]      Resistant Ampicillin/       16/8          Resistant Sulbactam Aztreonam         <=4           Susceptible Cefazolin         >16           Resistant Cefepime          <=2           Susceptible Cefoxitin         16            Resistant Ceftriaxone       2             Intermediate Ciprofloxacin     <=0.25        Susceptible Ertapenem         <=0.5         Susceptible Gentamicin        <=2           Susceptible Levofloxacin      <=0.5         Susceptible Meropenem         <=1           Susceptible Piperacillin/     <=8           Susceptible Tazobactam Tobramycin        <=2           Susceptible Trimethoprim/     <=0.5/9.5     Susceptible Sulfa

## 2024-08-21 NOTE — PHYSICAL EXAM
[Binocular Microscopic Exam] : Binocular microscopic exam was performed [Rinne Test Air Conduction Persists > Bone Conduction Right] : air conduction greater than bone conduction on the right [Rinne Test Air Conduction Persists > Bone Conduction Left] : air conduction greater than bone conduction on the left [Hearing Casper Test (Tuning Fork On Forehead)] : no lateralization of tone [Hearing Loss Right Only] : normal [Hearing Loss Left Only] : normal [Nystagmus] : ~T no ~M nystagmus was seen [Fukuda Step Test] : Fukuda Step Test was Negative [Romberg's Sign] : Romberg's sign was absent [Fistula Sign] : Fistula Sign: Negative [Past-Pointing] : Past-Pointing: Negative [Ronda-Hallmioseske] : Clayton-Hallpike: Negative [FreeTextEntry9] : mild amt erythema, placed powder [Normal] : no rashes

## 2024-08-22 LAB — EAR NOSE AND THROAT CULTURE: ABNORMAL

## 2024-11-04 ENCOUNTER — DOCTOR'S OFFICE (OUTPATIENT)
Facility: LOCATION | Age: 39
Setting detail: OPHTHALMOLOGY
End: 2024-11-04
Payer: COMMERCIAL

## 2024-11-04 DIAGNOSIS — H11.153: ICD-10-CM

## 2024-11-04 DIAGNOSIS — H00.14: ICD-10-CM

## 2024-11-04 DIAGNOSIS — H01.00: ICD-10-CM

## 2024-11-04 PROCEDURE — 92014 COMPRE OPH EXAM EST PT 1/>: CPT | Performed by: STUDENT IN AN ORGANIZED HEALTH CARE EDUCATION/TRAINING PROGRAM

## 2024-11-04 ASSESSMENT — LID EXAM ASSESSMENTS
OD_BLEPHARITIS: 1+
OS_BLEPHARITIS: 1+

## 2024-11-04 ASSESSMENT — CONFRONTATIONAL VISUAL FIELD TEST (CVF)
OS_FINDINGS: FULL
OD_FINDINGS: FULL

## 2024-11-04 ASSESSMENT — VISUAL ACUITY
OS_BCVA: 20/20
OD_BCVA: 20/25-

## 2024-11-13 ENCOUNTER — DOCTOR'S OFFICE (OUTPATIENT)
Facility: LOCATION | Age: 39
Setting detail: OPHTHALMOLOGY
End: 2024-11-13
Payer: COMMERCIAL

## 2024-11-13 DIAGNOSIS — H11.153: ICD-10-CM

## 2024-11-13 DIAGNOSIS — H00.14: ICD-10-CM

## 2024-11-13 PROCEDURE — 99213 OFFICE O/P EST LOW 20 MIN: CPT | Performed by: STUDENT IN AN ORGANIZED HEALTH CARE EDUCATION/TRAINING PROGRAM

## 2024-11-13 PROCEDURE — 67800 REMOVE EYELID LESION: CPT | Performed by: STUDENT IN AN ORGANIZED HEALTH CARE EDUCATION/TRAINING PROGRAM

## 2024-11-13 ASSESSMENT — LID EXAM ASSESSMENTS
OS_BLEPHARITIS: 1+
OD_BLEPHARITIS: 1+

## 2024-11-20 ENCOUNTER — RX ONLY (RX ONLY)
Age: 39
End: 2024-11-20

## 2024-11-20 ASSESSMENT — REFRACTION_AUTOREFRACTION
OD_AXIS: 180
OS_AXIS: 175
OS_CYLINDER: -1.00
OD_SPHERE: +0.50
OS_SPHERE: +0.75
OD_CYLINDER: -0.50

## 2024-11-20 ASSESSMENT — KERATOMETRY
OS_K2POWER_DIOPTERS: 43.75
OS_K1POWER_DIOPTERS: 42.25
OD_K2POWER_DIOPTERS: 44.50
OS_AXISANGLE_DEGREES: 091
OD_AXISANGLE_DEGREES: 087
OD_K1POWER_DIOPTERS: 42.75

## 2024-11-20 ASSESSMENT — VISUAL ACUITY
OD_BCVA: 20/25-2
OS_BCVA: 20/20

## 2025-01-13 ENCOUNTER — OFFICE (OUTPATIENT)
Facility: LOCATION | Age: 40
Setting detail: OPHTHALMOLOGY
End: 2025-01-13
Payer: COMMERCIAL

## 2025-01-13 DIAGNOSIS — H00.14: ICD-10-CM

## 2025-01-13 PROCEDURE — 67800 REMOVE EYELID LESION: CPT | Mod: E1 | Performed by: OPHTHALMOLOGY

## 2025-01-13 PROCEDURE — 92285 EXTERNAL OCULAR PHOTOGRAPHY: CPT | Performed by: OPHTHALMOLOGY

## 2025-01-14 ENCOUNTER — RX ONLY (RX ONLY)
Age: 40
End: 2025-01-14

## 2025-01-14 ASSESSMENT — REFRACTION_AUTOREFRACTION
OD_CYLINDER: -0.50
OS_SPHERE: +0.75
OD_SPHERE: +0.50
OS_CYLINDER: -1.00
OS_AXIS: 175
OD_AXIS: 180

## 2025-01-14 ASSESSMENT — LID EXAM ASSESSMENTS
OD_BLEPHARITIS: 1+
OS_BLEPHARITIS: 1+

## 2025-01-14 ASSESSMENT — KERATOMETRY
OD_K1POWER_DIOPTERS: 42.75
OS_K2POWER_DIOPTERS: 43.75
OS_AXISANGLE_DEGREES: 091
OS_K1POWER_DIOPTERS: 42.25
OD_K2POWER_DIOPTERS: 44.50
OD_AXISANGLE_DEGREES: 087

## 2025-01-14 ASSESSMENT — VISUAL ACUITY
OS_BCVA: 20/20
OD_BCVA: 20/25+2